# Patient Record
Sex: FEMALE | Race: WHITE | NOT HISPANIC OR LATINO | Employment: OTHER | URBAN - METROPOLITAN AREA
[De-identification: names, ages, dates, MRNs, and addresses within clinical notes are randomized per-mention and may not be internally consistent; named-entity substitution may affect disease eponyms.]

---

## 2017-01-06 ENCOUNTER — OFFICE VISIT (OUTPATIENT)
Dept: ENDOCRINOLOGY | Facility: CLINIC | Age: 67
End: 2017-01-06
Payer: MEDICARE

## 2017-01-06 ENCOUNTER — LAB VISIT (OUTPATIENT)
Dept: LAB | Facility: HOSPITAL | Age: 67
End: 2017-01-06
Attending: INTERNAL MEDICINE
Payer: MEDICARE

## 2017-01-06 VITALS
BODY MASS INDEX: 26.06 KG/M2 | WEIGHT: 166 LBS | SYSTOLIC BLOOD PRESSURE: 110 MMHG | HEIGHT: 67 IN | DIASTOLIC BLOOD PRESSURE: 64 MMHG | HEART RATE: 76 BPM

## 2017-01-06 DIAGNOSIS — E03.4 HYPOTHYROIDISM DUE TO ACQUIRED ATROPHY OF THYROID: Primary | ICD-10-CM

## 2017-01-06 DIAGNOSIS — M81.0 OSTEOPOROSIS: ICD-10-CM

## 2017-01-06 DIAGNOSIS — E04.1 NONTOXIC UNINODULAR GOITER: ICD-10-CM

## 2017-01-06 DIAGNOSIS — E55.9 UNSPECIFIED VITAMIN D DEFICIENCY: ICD-10-CM

## 2017-01-06 DIAGNOSIS — E03.9 HYPOTHYROIDISM, UNSPECIFIED TYPE: Primary | ICD-10-CM

## 2017-01-06 DIAGNOSIS — E03.4 HYPOTHYROIDISM DUE TO ACQUIRED ATROPHY OF THYROID: ICD-10-CM

## 2017-01-06 LAB
25(OH)D3+25(OH)D2 SERPL-MCNC: 90 NG/ML
TSH SERPL DL<=0.005 MIU/L-ACNC: 2.15 UIU/ML
TSH SERPL DL<=0.005 MIU/L-ACNC: 2.15 UIU/ML

## 2017-01-06 PROCEDURE — 82306 VITAMIN D 25 HYDROXY: CPT

## 2017-01-06 PROCEDURE — 99999 PR PBB SHADOW E&M-EST. PATIENT-LVL III: CPT | Mod: PBBFAC,,, | Performed by: INTERNAL MEDICINE

## 2017-01-06 PROCEDURE — 99204 OFFICE O/P NEW MOD 45 MIN: CPT | Mod: S$PBB,,, | Performed by: INTERNAL MEDICINE

## 2017-01-06 PROCEDURE — 36415 COLL VENOUS BLD VENIPUNCTURE: CPT | Mod: PO

## 2017-01-06 PROCEDURE — 84443 ASSAY THYROID STIM HORMONE: CPT

## 2017-01-06 RX ORDER — OLANZAPINE 20 MG/1
TABLET ORAL
Refills: 4 | COMMUNITY
Start: 2016-12-01 | End: 2019-01-31 | Stop reason: SDUPTHER

## 2017-01-06 NOTE — PROGRESS NOTES
Subjective:      Patient ID: Vasiliy Harrell is a 66 y.o. female.    Chief Complaint:  Hypothyroidism      History of Present Illness    Ms.Cathy BREANNE Harrell is moving her care to [facility] for her hypothyroidism   She was following with  for her hypothyroidism     She is been on synthroid 75 mcg daily for > 1 year     Found on routine examination   No h/o goiter   FH : none for thyroid disease     No height loss   No fractures   ? H/o osteoporosis   Takes calcium and vitamin D     Review of Systems   Constitutional: Negative for unexpected weight change.   Eyes: Positive for visual disturbance (blind with b/l eyes).   Cardiovascular: Negative for palpitations.   Gastrointestinal: Positive for diarrhea (once in a while, probiotics help). Negative for constipation.   Endocrine: Positive for heat intolerance and polydipsia.   Musculoskeletal: Negative for arthralgias and back pain.   Neurological: Negative for tremors.   Psychiatric/Behavioral: Negative for sleep disturbance. The patient is nervous/anxious.        Objective:   Physical Exam   Constitutional: She appears well-developed.   HENT:   Right Ear: External ear normal.   Left Ear: External ear normal.   Nose: Nose normal.   Neck: No tracheal deviation present. No thyromegaly present.   Cardiovascular: Normal rate.    No murmur heard.  Pulmonary/Chest: Effort normal and breath sounds normal.   Abdominal: Soft. There is no tenderness. No hernia.   Musculoskeletal: She exhibits no edema.   Neurological: She displays normal reflexes. No cranial nerve deficit.   Skin: No rash noted.          Psychiatric: She has a normal mood and affect. Judgment normal.   Vitals reviewed.      Lab Review:   Results for VASILIY HARRELL (MRN [id number]) as of 1/6/2017 11:24   Ref. Range 9/23/2016 10:30   Sodium Latest Ref Range: 136 - 145 mmol/L 145   Potassium Latest Ref Range: 3.5 - 5.1 mmol/L 4.2   Chloride Latest Ref Range: 95 - 110 mmol/L 106   CO2 Latest Ref Range: 23 - 29  mmol/L 27   Anion Gap Latest Ref Range: 8 - 16 mmol/L 12   BUN, Bld Latest Ref Range: 7 - 18 mg/dL 11   Creatinine Latest Ref Range: 0.50 - 1.40 mg/dL 0.72   eGFR if non African American Latest Ref Range: >60 mL/min/1.73 m^2 >60   eGFR if  Latest Ref Range: >60 mL/min/1.73 m^2 >60   Glucose Latest Ref Range: 70 - 110 mg/dL 96   Calcium Latest Ref Range: 8.4 - 10.2 mg/dL 9.1   Alkaline Phosphatase Latest Ref Range: 38 - 145 U/L 74   Total Protein Latest Ref Range: 6.0 - 8.4 g/dL 7.3   Albumin Latest Ref Range: 3.5 - 5.2 g/dL 4.2   Total Bilirubin Latest Ref Range: 0.1 - 1.0 mg/dL 0.6   AST Latest Ref Range: 14 - 36 U/L 26   ALT Latest Ref Range: 10 - 44 U/L 26     Results for VASILIY NOLASCO (MRN [id number]) as of 1/6/2017 11:24   Ref. Range 6/29/2016 11:22 8/15/2016 09:40   TSH Latest Ref Range: 0.400 - 4.000 uIU/mL 0.547 3.930   Free T4 Latest Ref Range: 0.78 - 2.19 ng/dL 1.38 1.20     Assessment:     1. Hypothyroidism due to acquired atrophy of thyroid  TSH    TSH   2. Unspecified vitamin D deficiency     3. Osteoporosis  Vitamin D        # hypothyroidism   - clinically euthyroid  - get TSH today  - continue synthroid 75 mcg daily     # ? osteoporosis   - continue vitamin d and calcium   Preferably food source  Get records from ;'s office   If DXA > 2 years ago we will repeat DXA scan   Not fracturing         Plan:     Follow up:  Get TSH, vit D today, CMP    RTC in 4 months with TSH

## 2017-01-06 NOTE — LETTER
January 6, 2017      Madonna Gant MD  [address] [address]           [facility]  [address] [facility] [address] [place] [address]  Phone: [phone]  Fax: [phone]          Patient: Yamileth Harrell   MR Number: [id number]   YOB: 1950   Date of Visit: 1/6/2017       Dear Dr. Madonna Gant:    Thank you for referring Yamileth Harrell to me for evaluation. Attached you will find relevant portions of my assessment and plan of care.    If you have questions, please do not hesitate to call me. I look forward to following Yamileth Harrell along with you.    Sincerely,    Wilton Hanna MD    Enclosure  CC:  No Recipients    If you would like to receive this communication electronically, please contact [email or web address] or [phone] to request more information on [employer] access.    For providers and/or their staff who would like to refer a patient to [facility], please contact us through our one-stop-shop provider referral line, [facility], at [phone].    If you feel you have received this communication in error or would no longer like to receive these types of communications, please e-mail [email or web address]

## 2017-01-09 ENCOUNTER — TELEPHONE (OUTPATIENT)
Dept: ENDOCRINOLOGY | Facility: CLINIC | Age: 67
End: 2017-01-09

## 2017-01-09 NOTE — TELEPHONE ENCOUNTER
Please call Ms.Yamileth RAYMUNDO Harrell and let her know that her TSH is at goal   Continue current dose of synthroid     Her vitamin D level is higher range of normal   Cut back to vitamin D 1000 units daily

## 2017-01-10 NOTE — TELEPHONE ENCOUNTER
LM informing pt's sister, Jeri Harrell, of lab results and Dr. Hanna's recommendation to continue current dose of synthroid and decrease Vit D to 1000 units daily. Call if any questions.

## 2017-04-19 ENCOUNTER — LAB VISIT (OUTPATIENT)
Dept: LAB | Facility: HOSPITAL | Age: 67
End: 2017-04-19
Attending: PEDIATRICS
Payer: MEDICARE

## 2017-04-19 DIAGNOSIS — E03.9 HYPOTHYROIDISM, UNSPECIFIED TYPE: ICD-10-CM

## 2017-04-19 LAB
T4 FREE SERPL-MCNC: 1.08 NG/DL
TSH SERPL DL<=0.005 MIU/L-ACNC: 4.99 UIU/ML

## 2017-04-19 PROCEDURE — 84439 ASSAY OF FREE THYROXINE: CPT

## 2017-04-19 PROCEDURE — 84443 ASSAY THYROID STIM HORMONE: CPT

## 2017-04-19 PROCEDURE — 36415 COLL VENOUS BLD VENIPUNCTURE: CPT | Mod: PO

## 2017-04-26 ENCOUNTER — OFFICE VISIT (OUTPATIENT)
Dept: ENDOCRINOLOGY | Facility: CLINIC | Age: 67
End: 2017-04-26
Payer: MEDICARE

## 2017-04-26 VITALS
DIASTOLIC BLOOD PRESSURE: 78 MMHG | HEIGHT: 67 IN | SYSTOLIC BLOOD PRESSURE: 124 MMHG | WEIGHT: 166.31 LBS | BODY MASS INDEX: 26.1 KG/M2 | HEART RATE: 100 BPM

## 2017-04-26 DIAGNOSIS — E03.9 HYPOTHYROIDISM, UNSPECIFIED TYPE: Primary | ICD-10-CM

## 2017-04-26 DIAGNOSIS — M81.0 OSTEOPOROSIS, UNSPECIFIED OSTEOPOROSIS TYPE, UNSPECIFIED PATHOLOGICAL FRACTURE PRESENCE: ICD-10-CM

## 2017-04-26 DIAGNOSIS — E03.4 HYPOTHYROIDISM DUE TO ACQUIRED ATROPHY OF THYROID: Primary | ICD-10-CM

## 2017-04-26 PROCEDURE — 99213 OFFICE O/P EST LOW 20 MIN: CPT | Mod: PBBFAC,PO | Performed by: INTERNAL MEDICINE

## 2017-04-26 PROCEDURE — 99214 OFFICE O/P EST MOD 30 MIN: CPT | Mod: S$PBB,,, | Performed by: INTERNAL MEDICINE

## 2017-04-26 PROCEDURE — 99999 PR PBB SHADOW E&M-EST. PATIENT-LVL III: CPT | Mod: PBBFAC,,, | Performed by: INTERNAL MEDICINE

## 2017-04-26 RX ORDER — LEVOTHYROXINE SODIUM 75 UG/1
75 TABLET ORAL
Qty: 90 TABLET | Refills: 3 | Status: SHIPPED | OUTPATIENT
Start: 2017-04-26 | End: 2017-10-25 | Stop reason: SDUPTHER

## 2017-04-26 RX ORDER — METOPROLOL TARTRATE 25 MG/1
TABLET, FILM COATED ORAL
Refills: 1 | COMMUNITY
Start: 2017-02-14 | End: 2017-06-26

## 2017-04-26 NOTE — PROGRESS NOTES
Subjective:      Patient ID: Vasiliy Harrell is a 66 y.o. female.    Chief Complaint:  Hypothyroidism      History of Present Illness    Ms.Cathy BREANNE Harrell is moving her care to [facility] for her hypothyroidism   She was following with  for her hypothyroidism     She is been on synthroid 75 mcg daily for > 1 year     Found on routine examination   No h/o goiter   FH : none for thyroid disease     No height loss   No fractures   ? H/o osteoporosis   Takes calcium and vitamin D     Review of Systems   Constitutional: Negative for unexpected weight change.   Eyes: Positive for visual disturbance (blind with b/l eyes).   Cardiovascular: Negative for palpitations.   Gastrointestinal: Positive for diarrhea (once in a while, probiotics help). Negative for constipation.   Endocrine: Positive for heat intolerance and polydipsia.   Musculoskeletal: Negative for arthralgias and back pain.   Neurological: Negative for tremors.   Psychiatric/Behavioral: Negative for sleep disturbance. The patient is nervous/anxious.        Objective:   Physical Exam   Constitutional: She appears well-developed.   HENT:   Right Ear: External ear normal.   Left Ear: External ear normal.   Nose: Nose normal.   Neck: No tracheal deviation present. No thyromegaly present.   Cardiovascular: Normal rate.    No murmur heard.  Pulmonary/Chest: Effort normal and breath sounds normal.   Abdominal: Soft. There is no tenderness. No hernia.   Musculoskeletal: She exhibits no edema.   Neurological: She displays normal reflexes. No cranial nerve deficit.   Skin: No rash noted.          Psychiatric: She has a normal mood and affect. Judgment normal.   Vitals reviewed.      Lab Review:   Results for VASILIY HARRELL (MRN [id number]) as of 1/6/2017 11:24   Ref. Range 9/23/2016 10:30   Sodium Latest Ref Range: 136 - 145 mmol/L 145   Potassium Latest Ref Range: 3.5 - 5.1 mmol/L 4.2   Chloride Latest Ref Range: 95 - 110 mmol/L 106   CO2 Latest Ref Range: 23 - 29  mmol/L 27   Anion Gap Latest Ref Range: 8 - 16 mmol/L 12   BUN, Bld Latest Ref Range: 7 - 18 mg/dL 11   Creatinine Latest Ref Range: 0.50 - 1.40 mg/dL 0.72   eGFR if non African American Latest Ref Range: >60 mL/min/1.73 m^2 >60   eGFR if  Latest Ref Range: >60 mL/min/1.73 m^2 >60   Glucose Latest Ref Range: 70 - 110 mg/dL 96   Calcium Latest Ref Range: 8.4 - 10.2 mg/dL 9.1   Alkaline Phosphatase Latest Ref Range: 38 - 145 U/L 74   Total Protein Latest Ref Range: 6.0 - 8.4 g/dL 7.3   Albumin Latest Ref Range: 3.5 - 5.2 g/dL 4.2   Total Bilirubin Latest Ref Range: 0.1 - 1.0 mg/dL 0.6   AST Latest Ref Range: 14 - 36 U/L 26   ALT Latest Ref Range: 10 - 44 U/L 26     Results for VASILIY NOLASCO (MRN [id number]) as of 1/6/2017 11:24   Ref. Range 6/29/2016 11:22 8/15/2016 09:40   TSH Latest Ref Range: 0.400 - 4.000 uIU/mL 0.547 3.930   Free T4 Latest Ref Range: 0.78 - 2.19 ng/dL 1.38 1.20     Results for orders placed or performed in visit on 04/19/17   TSH   Result Value Ref Range    TSH 4.987 (H) 0.400 - 4.000 uIU/mL   T4, free   Result Value Ref Range    Free T4 1.08 0.71 - 1.51 ng/dL       Assessment:     1. Hypothyroidism due to acquired atrophy of thyroid     2. Osteoporosis, unspecified osteoporosis type, unspecified pathological fracture presence          # hypothyroidism   - clinically euthyroid  - separate synthroid from other meds   - continue synthroid 75 mcg daily   - repeat TSH in 2 months and before next visit     # ? osteoporosis   - continue vitamin d and calcium   Preferably food source  States she will get DXA scan with    Avoid over treatment with synthroid     # CAD  Avoid overtreatment             Plan:     Follow up:  Blood work in 2 months and before next visit     RTC in 1 months with TSH

## 2017-04-26 NOTE — MR AVS SNAPSHOT
[facility]  [address] [facility] [address]  [facility] [address]  Phone: [phone]  Fax: [phone]                  Yamileth Harrell   2017 2:30 PM   Office Visit    Description:  Female : 1950   Provider:  Wilton Hanna MD   Department:  [facility]           Reason for Visit     Hypothyroidism           Diagnoses this Visit        Comments    Hypothyroidism due to acquired atrophy of thyroid    -  Primary     Osteoporosis, unspecified osteoporosis type, unspecified pathological fracture presence                To Do List           Future Appointments        Provider Department Dept Phone    2017 10:00 AM LAB, [place] [facility] [phone]      Goals (5 Years of Data)     None       These Medications        Disp Refills Start End    levothyroxine (SYNTHROID) 75 MCG tablet 90 tablet 3 2017     Take 1 tablet (75 mcg total) by mouth before breakfast. - Oral    Pharmacy: [facility] [address] [place] [address] [address] [place] [facility] [address] Ph #: [phone]         [facility] On Call     [facility] Nurse Care Line -  Assistance  Unless otherwise directed by your provider, please contact [facility], our nurse care line that is available for  assistance.     Registered nurses in the [facility] Center provide: appointment scheduling, clinical advisement, health education, and other advisory services.  Call: [phone] (toll free)               Medications           Message regarding Medications     Verify the changes and/or additions to your medication regime listed below are the same as discussed with your clinician today.  If any of these changes or additions are incorrect, please notify your healthcare provider.        CHANGE how you are taking these medications     Start Taking Instead of    levothyroxine (SYNTHROID) 75 MCG tablet SYNTHROID 75 mcg tablet    Dosage:  Take 1 tablet (75  "mcg total) by mouth before breakfast. Dosage:  TAKE 1 TABLET BY MOUTH BEFORE BREAKFAST    Reason for Change:  Reorder       STOP taking these medications     cholecalciferol, vitamin D3, 5,000 unit capsule Take 5,000 Units by mouth once daily.    omeprazole (PRILOSEC OTC) 20 MG tablet Take 20 mg by mouth daily as needed.            Verify that the below list of medications is an accurate representation of the medications you are currently taking.  If none reported, the list may be blank. If incorrect, please contact your healthcare provider. Carry this list with you in case of emergency.           Current Medications     aspirin (ECOTRIN) 81 MG EC tablet Take 81 mg by mouth once daily.    busPIRone (BUSPAR) 5 MG Tab Take 5 mg by mouth 3 (three) times daily.    cetirizine (ZYRTEC) 10 MG tablet Take 10 mg by mouth once daily.    levothyroxine (SYNTHROID) 75 MCG tablet Take 1 tablet (75 mcg total) by mouth before breakfast.    methylcellulose, laxative, (CITRUCEL) 500 mg Tab Take 1 tablet by mouth once daily.    metoprolol tartrate (LOPRESSOR) 25 MG tablet TK 1/2 T PO BID    multivitamin (ONE DAILY MULTIVITAMIN) per tablet Take 1 tablet by mouth once daily.    olanzapine (ZYPREXA) 20 MG tablet TK 1 T PO  AT BEDTIME    rosuvastatin (CRESTOR) 10 MG tablet Take one tablet by mouth daily           Clinical Reference Information           Your Vitals Were     BP Pulse Height Weight BMI    124/78 (BP Method: Manual) 100 5' 7" (1.702 m) 75.4 kg (166 lb 5.4 oz) 26.05 kg/m2      Blood Pressure          Most Recent Value    BP  124/78      Allergies as of 4/26/2017     Pcn [Penicillins]      Immunizations Administered on Date of Encounter - 4/26/2017     None      Orders Placed During Today's Visit     Future Labs/Procedures Expected by Expires    TSH  4/26/2017 6/25/2018    TSH  4/26/2017 6/25/2018    VITAMIN D  4/26/2017 6/25/2018    Vitamin D  4/26/2017 6/25/2018      [facility] Sign-Up     Activating your [email or web address] account is " as easy as 1-2-3!     1) Visit [email or web address], select Sign Up Now, enter this activation code and your date of birth, then select Next.  [email or web address]  Expires: 6/10/2017  3:37 PM      2) Create a username and password to use when you visit [facility] in the future and select a security question in case you lose your password and select Next.    3) Enter your e-mail address and click Sign Up!    Additional Information  If you have questions, please e-mail [email or web address] or call [phone] to talk to our [email or web address] staff. Remember, [facility] is NOT to be used for urgent needs. For medical emergencies, dial 911.         Language Assistance Services     ATTENTION: Language assistance services are available, free of charge. Please call [phone].      ATENCIÓN: Si habla emyañol, tiene a hickman disposición servicios gratuitos de asistencia lingüística. Llame al [phone].     CHÚ Ý: N?u b?n nói Ti?ng Vi?t, có các d?ch v? h? tr? ngôn ng? mi?n phí dành cho b?n. G?i s? [phone].         [facility] complies with applicable Federal civil rights laws and does not discriminate on the basis of race, color, national origin, age, disability, or sex.

## 2017-06-26 ENCOUNTER — LAB VISIT (OUTPATIENT)
Dept: LAB | Facility: HOSPITAL | Age: 67
End: 2017-06-26
Attending: INTERNAL MEDICINE
Payer: MEDICARE

## 2017-06-26 DIAGNOSIS — M81.0 OSTEOPOROSIS, UNSPECIFIED OSTEOPOROSIS TYPE, UNSPECIFIED PATHOLOGICAL FRACTURE PRESENCE: ICD-10-CM

## 2017-06-26 DIAGNOSIS — E03.4 HYPOTHYROIDISM DUE TO ACQUIRED ATROPHY OF THYROID: ICD-10-CM

## 2017-06-26 LAB — TSH SERPL DL<=0.005 MIU/L-ACNC: 1.5 UIU/ML

## 2017-06-26 PROCEDURE — 36415 COLL VENOUS BLD VENIPUNCTURE: CPT | Mod: PO

## 2017-06-26 PROCEDURE — 84443 ASSAY THYROID STIM HORMONE: CPT

## 2017-06-28 ENCOUNTER — LAB VISIT (OUTPATIENT)
Dept: LAB | Facility: HOSPITAL | Age: 67
End: 2017-06-28
Attending: INTERNAL MEDICINE
Payer: MEDICARE

## 2017-06-28 DIAGNOSIS — M81.0 OSTEOPOROSIS, UNSPECIFIED OSTEOPOROSIS TYPE, UNSPECIFIED PATHOLOGICAL FRACTURE PRESENCE: ICD-10-CM

## 2017-06-28 DIAGNOSIS — E03.4 HYPOTHYROIDISM DUE TO ACQUIRED ATROPHY OF THYROID: ICD-10-CM

## 2017-06-28 LAB — 25(OH)D3+25(OH)D2 SERPL-MCNC: 64 NG/ML

## 2017-06-28 PROCEDURE — 36415 COLL VENOUS BLD VENIPUNCTURE: CPT | Mod: PO

## 2017-06-28 PROCEDURE — 82306 VITAMIN D 25 HYDROXY: CPT

## 2018-04-24 DIAGNOSIS — E03.4 HYPOTHYROIDISM DUE TO ACQUIRED ATROPHY OF THYROID: ICD-10-CM

## 2018-04-25 RX ORDER — LEVOTHYROXINE SODIUM 75 UG/1
75 TABLET ORAL
Qty: 30 TABLET | Refills: 0 | Status: SHIPPED | OUTPATIENT
Start: 2018-04-25 | End: 2018-07-16 | Stop reason: SDUPTHER

## 2018-07-23 PROBLEM — M85.89 OSTEOPENIA OF MULTIPLE SITES: Status: ACTIVE | Noted: 2018-07-23

## 2019-03-26 ENCOUNTER — CLINICAL SUPPORT (OUTPATIENT)
Dept: REHABILITATION | Facility: HOSPITAL | Age: 69
End: 2019-03-26
Payer: MEDICARE

## 2019-03-26 DIAGNOSIS — M25.652 STIFFNESS OF LEFT HIP JOINT: ICD-10-CM

## 2019-03-26 DIAGNOSIS — R29.898 WEAKNESS OF LEFT HIP: ICD-10-CM

## 2019-03-26 PROCEDURE — 97162 PT EVAL MOD COMPLEX 30 MIN: CPT | Mod: PO

## 2019-03-26 PROCEDURE — 97110 THERAPEUTIC EXERCISES: CPT | Mod: PO

## 2019-03-26 NOTE — PATIENT INSTRUCTIONS
Bridge        Lie back, legs bent. Inhale, pressing hips up. Keeping ribs in, lengthen lower back. Exhale, rolling down along spine from top.  Repeat 10 times. Do 3 sessions per day.     [email or web address]     Copyright © [employer]. All rights reserved.   Low Back Stretch: One leg (Supine)        Lying on back, bring one knee toward chest by pulling gently behind knee.  Hold 30 seconds. Repeat with other leg. Perform 3 times for one session, 3 times per day.     Copyright © [employer]. All rights reserved.   External Rotation: Hip - Knees Apart (Hook-Lying)        Lie with hips and knees bent, band tied just above knees. Pull knees apart. Hold for 5 seconds.   Repeat 10 times. Do 3 times a day.    Copyright © [employer]. All rights reserved.
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
## 2019-03-27 NOTE — PROGRESS NOTES
Physical Therapy Daily Treatment Note     Name: Yamileth Harrell  Clinic Number: [id number]    Therapy Diagnosis:   Encounter Diagnoses   Name Primary?    Stiffness of left hip joint     Weakness of left hip      Physician: Robert Serrato MD    Visit Date: 3/28/2019  Physician Orders: PT Eval and Treat   Medical Diagnosis from Referral: trochanteric bursitis of left hip  Evaluation Date: 3/26/2019  Authorization Period Expiration: 12/31/19  Plan of Care Expiration: 5/26/19  Visit # / Visits authorized: 2/ 20    Time In: 1500  Time Out: 1540  Total Billable Time: 30 minutes    Precautions: Standard and schizophrenia, legally blind    Subjective     Pt reports: that her left hip hurts all the time. Patient states that walking and standing up for too long will make her left hip hurt. She was compliant with home exercise program.  Response to previous treatment: no adverse effect   Functional change: too soon to determine    Pain: 5/10  Location: left hip    Objective     Yamileth received therapeutic exercises to develop strength, endurance, ROM, flexibility, posture and core stabilization for 25 minutes including:  PROM to LLE (hip flexion, abduction, hip IR/ER) x 5 minutes   DL bridge 2x10  L SKTC x 30 sec x 2   Supine hip abduction x10 manual resistance  Supine hip adduction ball squeeze x 10, 3 sec hold   Seated LAQ 2x10 ea  Standing with upright posture x 1 minute x 2 (narrow base to challenge balance)     Yamileth received the following manual therapy techniques: passive stretching were applied to the: LLE for 10 minutes, including:  Passive L piriformis, hamstring, hip adductor stretch    Yamileth participated in dynamic functional therapeutic activities to improve functional performance for 3  minutes, including:  Wheelchair to car transfer (CGA)    Home Exercises Provided and Patient Education Provided     Education provided:   - Continued HEP compliance   - Patient and patient's care giver instructed to apply heating  pad to left hip if soreness occurs (parameters given by PTA: low heat setting for only 15-20 minutes)     Written Home Exercises Provided: Patient instructed to cont prior HEP.  Exercises were reviewed and Yamileth was able to demonstrate them prior to the end of the session.  Yamileth demonstrated good  understanding of the education provided.     See EMR under Patient Instructions for exercises provided 03/26/19.    Assessment   Yamileth tolerated treatment fairly well today. Patient demonstrates fear avoidance behaviors especially with PROM and stretching specific exercises. Patient did tolerate gentle strengthening well without complaints of increased pain. Patient continues with poor postural endurance during ambulation requiring rest breaks to forward flex. Patient able to transition from wheelchair to car with only CGA for hand placement but actual transfer was performed with SBA only.     aYmileth is progressing well towards her goals.   Pt prognosis is Fair.     Pt will continue to benefit from skilled outpatient physical therapy to address the deficits listed in the problem list box on initial evaluation, provide pt/family education and to maximize pt's level of independence in the home and community environment.     Pt's spiritual, cultural and educational needs considered and pt agreeable to plan of care and goals.    Anticipated barriers to physical therapy: schizophrenia, visual impairment    Goals:   Short Term Goals: 4 weeks   - amb 300' on level tile without pain provocation or need for rest (progressing, not met)  - do tandem stance>10 sec without LOB or pain (progressing, not met)  - do anterior step up of 6  without pain provocation (progressing, not met)     Long Term Goals: 8 weeks   - pt will demonstrate improved hip and leg strength by 1/2 point via MMT to demonstrate improved functional strength (progressing, not met)  - pt will demonstrate decreased tenderness to lateral thigh (progressing, not  met)  - pt will demonstrate improved hip ROM by 5 degrees in each plane on L to demonstrate improved mobility (progressing, not met)      Plan     Continue current POC with emphasis on hip stability and BLE strength.    Nicky Cifuentes, PTA

## 2019-03-28 ENCOUNTER — CLINICAL SUPPORT (OUTPATIENT)
Dept: REHABILITATION | Facility: HOSPITAL | Age: 69
End: 2019-03-28
Payer: MEDICARE

## 2019-03-28 DIAGNOSIS — M25.652 STIFFNESS OF LEFT HIP JOINT: ICD-10-CM

## 2019-03-28 DIAGNOSIS — R29.898 WEAKNESS OF LEFT HIP: ICD-10-CM

## 2019-03-28 PROCEDURE — 97110 THERAPEUTIC EXERCISES: CPT | Mod: PO

## 2019-03-28 PROCEDURE — 97140 MANUAL THERAPY 1/> REGIONS: CPT | Mod: PO

## 2019-04-01 ENCOUNTER — CLINICAL SUPPORT (OUTPATIENT)
Dept: REHABILITATION | Facility: HOSPITAL | Age: 69
End: 2019-04-01
Payer: MEDICARE

## 2019-04-01 DIAGNOSIS — M25.652 STIFFNESS OF LEFT HIP JOINT: ICD-10-CM

## 2019-04-01 DIAGNOSIS — R29.898 WEAKNESS OF LEFT HIP: ICD-10-CM

## 2019-04-01 PROCEDURE — 97140 MANUAL THERAPY 1/> REGIONS: CPT | Mod: PO

## 2019-04-01 PROCEDURE — 97110 THERAPEUTIC EXERCISES: CPT | Mod: PO

## 2019-04-01 NOTE — PROGRESS NOTES
Physical Therapy Daily Treatment Note     Name: Yamileth Harrell  Clinic Number: [id number]    Therapy Diagnosis:   Encounter Diagnoses   Name Primary?    Stiffness of left hip joint     Weakness of left hip      Physician: Robert Serrato MD    Visit Date: 4/1/2019  Physician Orders: PT Eval and Treat   Medical Diagnosis from Referral: trochanteric bursitis of left hip  Evaluation Date: 3/26/2019  Authorization Period Expiration: 12/31/19  Plan of Care Expiration: 5/26/19  Visit # / Visits authorized: 3/ 20    Time In: 1055  Time Out: 1140  Total Billable Time: 45 minutes    Precautions: Standard and schizophrenia, legally blind    Subjective     Pt reports: that her left hip wasn't sore after last treatment session. Patient states she did have pain last night but she was able to comfortably sleep on the left hip last night.  She was compliant with home exercise program.  Response to previous treatment: no adverse effect   Functional change: able to sleep on left hip     Pain: 4/10  Location: left hip    Objective     Yamileth received therapeutic exercises to develop strength, endurance, ROM, flexibility, posture and core stabilization for 25 minutes including:  Ambulation from lobby into gym: 1 seated rest break in chairs along wall (beginnging and end of session, no rest break needed at end of session)  PROM to LLE (hip flexion, abduction, hip IR/ER) x 5 minutes   Supine figure 4 stretch 20 sec x 3   DL bridge 2x10  L SKTC x 30 sec x 2   Supine hip abduction x10 manual resistance  Supine hip adduction ball squeeze x 10, 3 sec hold   R SL clamshells 2x10  Seated LAQ 2x10 ea  Standing with upright posture x 1 minute x 2 (narrow base to challenge balance)     Yamileth received the following manual therapy techniques: passive stretching were applied to the: LLE for 10 minutes, including:  Passive L piriformis, hamstring, hip adductor stretch    ome Exercises Provided and Patient Education Provided     Education provided:   -  Continued HEP compliance   - Patient and patient's care giver instructed to apply heating pad to left hip if soreness occurs (parameters given by PTA: low heat setting for only 15-20 minutes)     Written Home Exercises Provided: Patient instructed to cont prior HEP.  Exercises were reviewed and Yamileth was able to demonstrate them prior to the end of the session.  Yamileth demonstrated good  understanding of the education provided.     See EMR under Patient Instructions for exercises provided 03/26/19.    Assessment   Yamileth tolerated treatment very well today. Patient demonstrates improving tolerance to ambulation as she did not need a seated rest break with return trip to [facility]. Patient able to progress to clamshells against gravity today with proper exercise form. Patient with improved tolerance to manual stretching today as she allowed for knee extension in order to properly stretch hamstrings.     Yamileth is progressing well towards her goals.   Pt prognosis is Fair.     Pt will continue to benefit from skilled outpatient physical therapy to address the deficits listed in the problem list box on initial evaluation, provide pt/family education and to maximize pt's level of independence in the home and community environment.     Pt's spiritual, cultural and educational needs considered and pt agreeable to plan of care and goals.    Anticipated barriers to physical therapy: schizophrenia, visual impairment    Goals:   Short Term Goals: 4 weeks   - amb 300' on level tile without pain provocation or need for rest (progressing, not met)  - do tandem stance>10 sec without LOB or pain (progressing, not met)  - do anterior step up of 6  without pain provocation (progressing, not met)     Long Term Goals: 8 weeks   - pt will demonstrate improved hip and leg strength by 1/2 point via MMT to demonstrate improved functional strength (progressing, not met)  - pt will demonstrate decreased tenderness to lateral thigh (progressing,  not met)  - pt will demonstrate improved hip ROM by 5 degrees in each plane on L to demonstrate improved mobility (progressing, not met)      Plan     Continue current POC with emphasis on hip stability and BLE strength.    Nicky Cifuentes, PTA

## 2019-04-03 ENCOUNTER — CLINICAL SUPPORT (OUTPATIENT)
Dept: REHABILITATION | Facility: HOSPITAL | Age: 69
End: 2019-04-03
Payer: MEDICARE

## 2019-04-03 DIAGNOSIS — R29.898 WEAKNESS OF LEFT HIP: ICD-10-CM

## 2019-04-03 DIAGNOSIS — M25.652 STIFFNESS OF LEFT HIP JOINT: ICD-10-CM

## 2019-04-03 PROCEDURE — 97140 MANUAL THERAPY 1/> REGIONS: CPT | Mod: PO

## 2019-04-03 PROCEDURE — 97110 THERAPEUTIC EXERCISES: CPT | Mod: PO

## 2019-04-03 NOTE — PROGRESS NOTES
Physical Therapy Daily Treatment Note     Name: Yamileth Harrell  Clinic Number: [id number]    Therapy Diagnosis:   Encounter Diagnoses   Name Primary?    Stiffness of left hip joint     Weakness of left hip      Physician: Robert Serrato MD    Visit Date: 4/3/2019  Physician Orders: PT Eval and Treat   Medical Diagnosis from Referral: trochanteric bursitis of left hip  Evaluation Date: 3/26/2019  Authorization Period Expiration: 12/31/19  Plan of Care Expiration: 5/26/19  Visit # / Visits authorized: 4/ 20    Time In: 1055  Time Out: 1145  Total Billable Time: 45 minutes    Precautions: Standard and schizophrenia, legally blind    Subjective     Pt reports: her hip felt great after therapy last session but she is very sore today. Patient states she has been exercising at home a lot and she is now very sore. Patient states her pain is always worse with walking. Patient's sister states that Yamileth tends to overdue her exercises and she feels that this is contributing to her soreness today. Patient's sister also states Yamileth doesn't spend much time walking at home and she is often in the recliner. She was compliant with home exercise program.  Response to previous treatment: no adverse effect   Functional change: able to sleep on left hip     Pain: 8/10  Location: left hip    Objective     Yamileth received therapeutic exercises to develop strength, endurance, ROM, flexibility, posture and core stabilization for 40 minutes including:   Ambulation from lobby into gym: 1 seated rest break in chairs along wall (beginnging and end of session, no rest break needed at end of session) (not performed today)  PROM to LLE (hip flexion, abduction, hip IR/ER) x 5 minutes   Supine figure 4 stretch 20 sec x 3   DL bridge 2x10  L SKTC x 30 sec x 2 (performed passive only)  Supine hip abduction x 10 manual resistance  Supine hip adduction ball squeeze x 10, 3 sec hold   R SL clamshells 2x10  Seated LAQ 2x10 ea  Standing with upright  posture x 1 minute x 2 (narrow base to challenge balance)     Yamileth received the following manual therapy techniques: passive stretching were applied to the: LLE for 10 minutes, including:  Passive L piriformis, hamstring, hip adductor stretch    ome Exercises Provided and Patient Education Provided     Education provided:   - Continued HEP compliance   - Patient instructed to stand up 1x every hour to promote upright posture. Patient also instructed to perform SKTC activity 1x per day. Patient also to perform bridging and glut sets 2-3x per day.     Written Home Exercises Provided: see education provided above.  Exercises were reviewed and Yamileth was able to demonstrate them prior to the end of the session.  Yamileth demonstrated good  understanding of the education provided.     See EMR under Patient Instructions for exercises provided 03/26/19.    Assessment   Yamileth with only fair tolerance to treatment today. Patient unable to perform ambulation activities today due to left hip pain. Attempted to stretch patient's left hip flexor but max flexibility restrictions noted and patient did not allow for positioning to achieve proper stretch. Patient educated that she is overdoing her exercises at home and this is contributing to her increased soreness over the last 2 days. Patient educated on the importance of performing gluteal strengthening and standing exercises today in order to achieve functional hip flexor stretch and neutral positioning. Both patient and patient's care giver reported understanding of instructions provided today.      Yamileth is progressing well towards her goals.   Pt prognosis is Fair.     Pt will continue to benefit from skilled outpatient physical therapy to address the deficits listed in the problem list box on initial evaluation, provide pt/family education and to maximize pt's level of independence in the home and community environment.     Pt's spiritual, cultural and educational needs  considered and pt agreeable to plan of care and goals.    Anticipated barriers to physical therapy: schizophrenia, visual impairment    Goals:   Short Term Goals: 4 weeks   - amb 300' on level tile without pain provocation or need for rest (progressing, not met)  - do tandem stance>10 sec without LOB or pain (progressing, not met)  - do anterior step up of 6  without pain provocation (progressing, not met)     Long Term Goals: 8 weeks   - pt will demonstrate improved hip and leg strength by 1/2 point via MMT to demonstrate improved functional strength (progressing, not met)  - pt will demonstrate decreased tenderness to lateral thigh (progressing, not met)  - pt will demonstrate improved hip ROM by 5 degrees in each plane on L to demonstrate improved mobility (progressing, not met)      Plan     Continue current POC with emphasis on hip stability and BLE strength.    Nicky Cifuentes, PTA

## 2019-04-08 ENCOUNTER — CLINICAL SUPPORT (OUTPATIENT)
Dept: REHABILITATION | Facility: HOSPITAL | Age: 69
End: 2019-04-08
Payer: MEDICARE

## 2019-04-08 DIAGNOSIS — M25.652 STIFFNESS OF LEFT HIP JOINT: ICD-10-CM

## 2019-04-08 DIAGNOSIS — R29.898 WEAKNESS OF LEFT HIP: ICD-10-CM

## 2019-04-08 PROCEDURE — 97110 THERAPEUTIC EXERCISES: CPT | Mod: PO

## 2019-04-08 PROCEDURE — 97140 MANUAL THERAPY 1/> REGIONS: CPT | Mod: PO

## 2019-04-08 NOTE — PROGRESS NOTES
Physical Therapy Daily Treatment Note     Name: Yamileth Harrell  Clinic Number: [id number]    Therapy Diagnosis:   Encounter Diagnoses   Name Primary?    Stiffness of left hip joint     Weakness of left hip      Physician: Robert Serrato MD    Visit Date: 4/8/2019  Physician Orders: PT Eval and Treat   Medical Diagnosis from Referral: trochanteric bursitis of left hip  Evaluation Date: 3/26/2019  Authorization Period Expiration: 12/31/19  Plan of Care Expiration: 5/26/19  Visit # / Visits authorized: 5/ 20    Time In: 1000  Time Out: 1055  Total Billable Time: 55 minutes    Precautions: Standard and schizophrenia, legally blind    Subjective     Pt reports: reports she was painful all weekend. Family is concerned that she shouldn't do her exercises at home because they are increasing her pain. She was compliant with home exercise program.  Response to previous treatment: no adverse effect   Functional change: able to sleep on left hip     Pain: 5/10  Location: left hip    Objective     Yamileth received therapeutic exercises to develop strength, endurance, ROM, flexibility, posture and core stabilization for 45 minutes including:   Ambulation from lobby into gym: no rest break needed on entrance  PROM to LLE (hip flexion, abduction, hip IR/ER) x 5 minutes   Supine figure 4 stretch 20 sec x 3   DL bridge 2x10  L SKTC x 30 sec x 2 (performed passive only)  Supine hip abduction x 10 manual resistance  Supine hip adduction ball squeeze 2 x 10, 3 sec hold   Supine hip flexor stretch x2 min  R SL clamshells 2x10  Seated LAQ 2x10 ea  Standing with upright posture x 1 minute x 2 (narrow base to challenge balance)   Tandem stance x30 sec each  Standing hip extension, abduction x10 each    Yamileth received the following manual therapy techniques: passive stretching were applied to the: LLE for 10 minutes, including:  Passive L piriformis, hamstring, hip adductor stretch    ome Exercises Provided and Patient Education Provided      Education provided:   - Continued HEP compliance   - Patient instructed to stand up 1x every hour to promote upright posture. Patient also instructed to perform SKTC activity 1x per day. Patient also to perform bridging and glut sets 2-3x per day.     Written Home Exercises Provided: see education provided above.  Exercises were reviewed and Yamileth was able to demonstrate them prior to the end of the session.  Yamileth demonstrated good  understanding of the education provided.     See EMR under Patient Instructions for exercises provided 03/26/19.    Assessment   Pt with fair-poor tolerance for exercises again today. Significant tightness in piriformis and hip flexors, with significant limitations with ROM. Required a lot of education on the importance of stretching into these motions, despite having some increase in pain. Pt reported understanding. Educated patient and family to focus on strengthening exercises at home, to perform only the sets and reps listed, and to perform them with someone else present.    Yamileth is progressing well towards her goals.   Pt prognosis is Fair.     Pt will continue to benefit from skilled outpatient physical therapy to address the deficits listed in the problem list box on initial evaluation, provide pt/family education and to maximize pt's level of independence in the home and community environment.     Pt's spiritual, cultural and educational needs considered and pt agreeable to plan of care and goals.    Anticipated barriers to physical therapy: schizophrenia, visual impairment    Goals:   Short Term Goals: 4 weeks   - amb 300' on level tile without pain provocation or need for rest (progressing, not met)  - do tandem stance>10 sec without LOB or pain (progressing, not met)  - do anterior step up of 6  without pain provocation (progressing, not met)     Long Term Goals: 8 weeks   - pt will demonstrate improved hip and leg strength by 1/2 point via MMT to demonstrate improved  functional strength (progressing, not met)  - pt will demonstrate decreased tenderness to lateral thigh (progressing, not met)  - pt will demonstrate improved hip ROM by 5 degrees in each plane on L to demonstrate improved mobility (progressing, not met)      Plan     Continue current POC with emphasis on hip stability and BLE strength.    Nicky Parr, PT

## 2019-04-10 ENCOUNTER — CLINICAL SUPPORT (OUTPATIENT)
Dept: REHABILITATION | Facility: HOSPITAL | Age: 69
End: 2019-04-10
Payer: MEDICARE

## 2019-04-10 DIAGNOSIS — R29.898 WEAKNESS OF LEFT HIP: ICD-10-CM

## 2019-04-10 DIAGNOSIS — M25.652 STIFFNESS OF LEFT HIP JOINT: ICD-10-CM

## 2019-04-10 PROCEDURE — 97140 MANUAL THERAPY 1/> REGIONS: CPT | Mod: PO

## 2019-04-10 PROCEDURE — 97110 THERAPEUTIC EXERCISES: CPT | Mod: PO

## 2019-04-10 NOTE — PROGRESS NOTES
"  Physical Therapy Daily Treatment Note     Name: Yamileth Harrell  Clinic Number: [id number]    Therapy Diagnosis:   Encounter Diagnoses   Name Primary?    Stiffness of left hip joint     Weakness of left hip      Physician: Robert Serrato MD    Visit Date: 4/10/2019  Physician Orders: PT Eval and Treat   Medical Diagnosis from Referral: trochanteric bursitis of left hip  Evaluation Date: 3/26/2019  Authorization Period Expiration: 12/31/19  Plan of Care Expiration: 5/26/19  Visit # / Visits authorized: 6/ 20    Time In: 1500  Time Out: 1600  Total Billable Time: 60 minutes    Precautions: Standard and schizophrenia, legally blind    Subjective     Pt reports: she is exhausted today from doctor's appointments. She had to sit in a wheelchair all day. She was compliant with home exercise program.  Response to previous treatment: no adverse effect   Functional change: able to sleep on left hip     Pain: 5/10  Location: left hip    Objective     Yamileth received therapeutic exercises to develop strength, endurance, ROM, flexibility, posture and core stabilization for 45 minutes including:   Ambulation from lobby into gym: no rest break needed on entrance  PROM to LLE (hip flexion, abduction, hip IR/ER) x 5 minutes   Supine figure 4 stretch 20 sec x 3   DL bridge 2x10  L SKTC x 30 sec x 2 (performed passive only)  Supine hip abduction x 10 manual resistance  Supine hip adduction ball squeeze 2 x 10, 3 sec hold   Supine hip flexor stretch x2 min  SL clamshells 2x10 BLE  TA set 10x5"  Seated LAQ 2x10 ea  Seated hamstring curl red theraband 2x10 each  Sit-to-stand x10   Standing with upright posture x 1 minute x 2 (narrow base to challenge balance)   Tandem stance x30 sec each  Standing hip extension, abduction x10 each    Yamileth received the following manual therapy techniques: passive stretching were applied to the: LLE for 15 minutes, including:  Passive L piriformis, hamstring, hip adductor stretch  Stick massage to IT band, " glutes, pirformis    ome Exercises Provided and Patient Education Provided     Education provided:   - Continued HEP compliance   - Patient instructed to stand up 1x every hour to promote upright posture. Patient also instructed to perform SKTC activity 1x per day. Patient also to perform bridging and glut sets 2-3x per day.     Written Home Exercises Provided: see education provided above.  Exercises were reviewed and Yamileth was able to demonstrate them prior to the end of the session.  Yamileth demonstrated good  understanding of the education provided.     See EMR under Patient Instructions for exercises provided 03/26/19.    Assessment   Unable to perform pelvic tilt but able to perform fair TA contraction without pelvic motion. Significant pain catastrophizing tendencies noted, with muscle guarding and difficulty understanding the difference between muscle stretch, muscle contraction, and pain. Continues to lack 10 degrees of extension from neutral with supine hip flexor stretch initially, improved with cues for deep breathing. Reported feeling better and looser after stick massage.   Yamileth is progressing well towards her goals.   Pt prognosis is Fair.     Pt will continue to benefit from skilled outpatient physical therapy to address the deficits listed in the problem list box on initial evaluation, provide pt/family education and to maximize pt's level of independence in the home and community environment.     Pt's spiritual, cultural and educational needs considered and pt agreeable to plan of care and goals.    Anticipated barriers to physical therapy: schizophrenia, visual impairment    Goals:   Short Term Goals: 4 weeks   - amb 300' on level tile without pain provocation or need for rest (progressing, not met)  - do tandem stance>10 sec without LOB or pain (progressing, not met)  - do anterior step up of 6  without pain provocation (progressing, not met)     Long Term Goals: 8 weeks   - pt will demonstrate  improved hip and leg strength by 1/2 point via MMT to demonstrate improved functional strength (progressing, not met)  - pt will demonstrate decreased tenderness to lateral thigh (progressing, not met)  - pt will demonstrate improved hip ROM by 5 degrees in each plane on L to demonstrate improved mobility (progressing, not met)      Plan     Continue current POC with emphasis on hip stability and BLE strength.    Nicky Parr, PT

## 2019-04-15 ENCOUNTER — CLINICAL SUPPORT (OUTPATIENT)
Dept: REHABILITATION | Facility: HOSPITAL | Age: 69
End: 2019-04-15
Payer: MEDICARE

## 2019-04-15 DIAGNOSIS — R29.898 WEAKNESS OF LEFT HIP: ICD-10-CM

## 2019-04-15 DIAGNOSIS — M25.652 STIFFNESS OF LEFT HIP JOINT: ICD-10-CM

## 2019-04-15 PROCEDURE — 97140 MANUAL THERAPY 1/> REGIONS: CPT | Mod: PO

## 2019-04-15 PROCEDURE — 97110 THERAPEUTIC EXERCISES: CPT | Mod: PO

## 2019-04-15 NOTE — PROGRESS NOTES
"  Physical Therapy Daily Treatment Note     Name: Yamileth Harrell  Clinic Number: [id number]    Therapy Diagnosis:   Encounter Diagnoses   Name Primary?    Stiffness of left hip joint     Weakness of left hip      Physician: Robert Serrato MD    Visit Date: 4/15/2019  Physician Orders: PT Eval and Treat   Medical Diagnosis from Referral: trochanteric bursitis of left hip  Evaluation Date: 3/26/2019  Authorization Period Expiration: 12/31/19  Plan of Care Expiration: 5/26/19  Visit # / Visits authorized: 7/ 20    Time In: 1055  Time Out: 1153  Total Billable Time: 58 minutes    Precautions: Standard and schizophrenia, legally blind    Subjective     Pt reports: she felt better after last treatment session. Patient states she does have less pain when walking from her bedroom to the living room. Patient states she is very focused on her pain and she doesn't feel like it is getting much better despite reports of improved walking tolerance in the house.  Patient states she now lies on her left side to make her hip "numb" so she can tolerate lying on her back.  She was compliant with home exercise program.  Response to previous treatment: no adverse effect   Functional change: able to sleep on left hip     Pain: 5/10  Location: left hip    Objective     Yamileth received therapeutic exercises to develop strength, endurance, ROM, flexibility, posture and core stabilization for 45 minutes including:   Ambulation from lobby into gym: 1x rest break   PROM to LLE (hip flexion, abduction, hip IR/ER) x 5 minutes   Prone lying x 30 sec (patient did not tolerate)  L SKTC x 30 sec x 2 (performed passive only)  DL bridge 2x10  Seated LAQ 2x10 ea (with sciatic nerve glide)  Standing upright x 1:30 minutes x 4 (heavy focus on upright trunk)   Sit-to-stand x10   Tandem stance x30 sec each  Standing hip extension, abduction x10 each    Not performed today:  Supine figure 4 stretch 20 sec x 3   Supine hip abduction x 10 manual " "resistance  Supine hip adduction ball squeeze 2 x 10, 3 sec hold   Supine hip flexor stretch x2 min  SL clamshells 2x10 BLE  TA set 10x5"  Seated hamstring curl red theraband 2x10 each    Yamileth received the following manual therapy techniques: passive stretching were applied to the: LLE for 10 minutes, including:  Passive L piriformis, hamstring, hip adductor stretch  Stick massage to IT band, glutes, pirformis    Home Exercises Provided and Patient Education Provided     Education provided:   - Continued HEP compliance, Importance of not over doing it   - Educated caregiver again today on the importance of standing several times a day to promote upright posture and normal spina curvature     Written Home Exercises Provided: Patient instructed to cont prior HEP.  Exercises were reviewed and Yamileth was able to demonstrate them prior to the end of the session.  Yamileth demonstrated good  understanding of the education provided.     See EMR under Patient Instructions for exercises provided 03/26/19.    Assessment   Yamileth with only fair tolerance to treatment session. Patient very expressive and vocal about L hip pain today and she did not allow for proper positioning to achieve stretching. Attempted prone positioning today to determine directional preference but patient unable to tolerate but stretching was noted through B hip flexors. Poor standing tolerance with pain limiting behaviors noted despite heavy education on the importance on upright posture.   Yamileth is progressing well towards her goals.   Pt prognosis is Fair.     Pt will continue to benefit from skilled outpatient physical therapy to address the deficits listed in the problem list box on initial evaluation, provide pt/family education and to maximize pt's level of independence in the home and community environment.     Pt's spiritual, cultural and educational needs considered and pt agreeable to plan of care and goals.    Anticipated barriers to physical " therapy: schizophrenia, visual impairment    Goals:   Short Term Goals: 4 weeks   - amb 300' on level tile without pain provocation or need for rest (progressing, not met)  - do tandem stance>10 sec without LOB or pain (progressing, not met)  - do anterior step up of 6  without pain provocation (progressing, not met)     Long Term Goals: 8 weeks   - pt will demonstrate improved hip and leg strength by 1/2 point via MMT to demonstrate improved functional strength (progressing, not met)  - pt will demonstrate decreased tenderness to lateral thigh (progressing, not met)  - pt will demonstrate improved hip ROM by 5 degrees in each plane on L to demonstrate improved mobility (progressing, not met)    Plan     Continue current POC with emphasis on hip stability and BLE strength.    Nicky Cifuentes, PTA

## 2019-04-18 ENCOUNTER — CLINICAL SUPPORT (OUTPATIENT)
Dept: REHABILITATION | Facility: HOSPITAL | Age: 69
End: 2019-04-18
Payer: MEDICARE

## 2019-04-18 DIAGNOSIS — M25.652 STIFFNESS OF LEFT HIP JOINT: ICD-10-CM

## 2019-04-18 DIAGNOSIS — R29.898 WEAKNESS OF LEFT HIP: ICD-10-CM

## 2019-04-18 PROCEDURE — 97140 MANUAL THERAPY 1/> REGIONS: CPT | Mod: PO

## 2019-04-18 PROCEDURE — 97110 THERAPEUTIC EXERCISES: CPT | Mod: PO

## 2019-04-18 NOTE — PROGRESS NOTES
Physical Therapy Daily Treatment Note     Name: Yamileth Harrell  Clinic Number: [id number]    Therapy Diagnosis:   Encounter Diagnoses   Name Primary?    Stiffness of left hip joint     Weakness of left hip      Physician: Robert Serrato MD    Visit Date: 4/18/2019  Physician Orders: PT Eval and Treat   Medical Diagnosis from Referral: trochanteric bursitis of left hip  Evaluation Date: 3/26/2019  Authorization Period Expiration: 12/31/19  Plan of Care Expiration: 5/26/19  Visit # / Visits authorized: 8/ 20    Time In: 1100  Time Out: 1200  Total Billable Time: 60 minutes    Precautions: Standard and schizophrenia, legally blind    Subjective     Pt reports: She went to see the chiropractor and got 2 adjustments. They did laser therapy and ultrasound as well, and want us to continue with ultrasound at therapy. Educated patient and family that we would not be performing ultrasound in therapy, as strengthening and stretching would be more effective for her specific issues.  She was compliant with home exercise program.  Response to previous treatment: no adverse effect   Functional change: able to sleep on left hip     Pain: 5/10  Location: left hip    Objective     Yamileth received therapeutic exercises to develop strength, endurance, ROM, flexibility, posture and core stabilization for 45 minutes including:   Ambulation from lobby into gym: 1x rest break   PROM to LLE (hip flexion, abduction, hip IR/ER) x 5 minutes   Prone quad stretch 2x1 min  Supine hip flexor stretch x2 min  DL bridge 2x10  LTR x10 each  Pelvic tilt x2 min  Seated LAQ 2x10 ea (with sciatic nerve glide)  Standing upright x 1:30 minutes x 4 (heavy focus on upright trunk)   Sit-to-stand x10   Tandem stance x30 sec each  Standing hip extension, abduction x10 each  Seated hamstring curl red theraband 2x10 each  SL clamshells 2x10 BLE red theraband    Not performed today:  L SKTC x 30 sec x 2 (performed passive only)  Supine figure 4 stretch 20 sec x 3  "  Supine hip abduction x 10 manual resistance  Supine hip adduction ball squeeze 2 x 10, 3 sec hold   TA set 10x5"    Yamileth received the following manual therapy techniques: passive stretching were applied to the: LLE for 15 minutes, including:  Passive L piriformis, hamstring, hip adductor stretch  Stick massage to IT band, glutes, pirformis    Home Exercises Provided and Patient Education Provided     Education provided:   - Continued HEP compliance, Importance of not over doing it   - Educated caregiver again today on the importance of standing several times a day to promote upright posture and normal spina curvature     Written Home Exercises Provided: Patient instructed to cont prior HEP.  Exercises were reviewed and Yamileth was able to demonstrate them prior to the end of the session.  Yamileth demonstrated good  understanding of the education provided.     See EMR under Patient Instructions for exercises provided 03/26/19.    Assessment   Pt with worse tolerance when walking in after her chiropractic appointment this morning, requiring 4-5 rest breaks. Fair tolerance for new exercises, with no pain in prone positioning, but reported pain with addition of quad stretch. Continued perseveration on pain symptoms, and no ability to distinguish between pain and stretching. Educated patient and family to perform 1 min of prone lying 1x/hour at home.   Yamileth is progressing well towards her goals.   Pt prognosis is Fair.     Pt will continue to benefit from skilled outpatient physical therapy to address the deficits listed in the problem list box on initial evaluation, provide pt/family education and to maximize pt's level of independence in the home and community environment.     Pt's spiritual, cultural and educational needs considered and pt agreeable to plan of care and goals.    Anticipated barriers to physical therapy: schizophrenia, visual impairment    Goals:   Short Term Goals: 4 weeks   - amb 300' on level tile " without pain provocation or need for rest (progressing, not met)  - do tandem stance>10 sec without LOB or pain (progressing, not met)  - do anterior step up of 6  without pain provocation (progressing, not met)     Long Term Goals: 8 weeks   - pt will demonstrate improved hip and leg strength by 1/2 point via MMT to demonstrate improved functional strength (progressing, not met)  - pt will demonstrate decreased tenderness to lateral thigh (progressing, not met)  - pt will demonstrate improved hip ROM by 5 degrees in each plane on L to demonstrate improved mobility (progressing, not met)    Plan     Continue current POC with emphasis on hip stability and BLE strength.    Nicky Parr, PT

## 2019-04-22 ENCOUNTER — CLINICAL SUPPORT (OUTPATIENT)
Dept: REHABILITATION | Facility: HOSPITAL | Age: 69
End: 2019-04-22
Payer: MEDICARE

## 2019-04-22 DIAGNOSIS — M25.652 STIFFNESS OF LEFT HIP JOINT: ICD-10-CM

## 2019-04-22 DIAGNOSIS — R29.898 WEAKNESS OF LEFT HIP: ICD-10-CM

## 2019-04-22 PROCEDURE — 97110 THERAPEUTIC EXERCISES: CPT | Mod: PO

## 2019-04-22 NOTE — PROGRESS NOTES
"  Physical Therapy Daily Treatment Note     Name: Yamileth Harrell  Clinic Number: [id number]    Therapy Diagnosis:   Encounter Diagnoses   Name Primary?    Stiffness of left hip joint     Weakness of left hip      Physician: Robert Serrato MD    Visit Date: 4/22/2019  Physician Orders: PT Eval and Treat   Medical Diagnosis from Referral: trochanteric bursitis of left hip  Evaluation Date: 3/26/2019  Authorization Period Expiration: 12/31/19  Plan of Care Expiration: 5/26/19  Visit # / Visits authorized: 9/ 20    Time In: 0830  Time Out: 0923  Total Billable Time: 5 minutes    Precautions: Standard and schizophrenia, legally blind    Subjective     Pt reports: She reports she did try laying on her stomach and "it was incredible". She reports she had pain yesterday. She was compliant with home exercise program.  Response to previous treatment: no adverse effect   Functional change: able to sleep on left hip     Pain: 4/10  Location: left hip    Objective     Yamileth received therapeutic exercises to develop strength, endurance, ROM, flexibility, posture and core stabilization for 53 minutes including:   Ambulation from lobby into gym: 0x rest break   PROM to LLE (hip flexion, abduction, hip IR/ER) x 5 minutes   Prone lying x2 min  Prone quad stretch 2x1 min- NP  Supine hip flexor stretch x2 min  DL bridge 2x10  LTR x10 each  Pelvic tilt x2 min- NP  Seated LAQ 2x10 ea (with sciatic nerve glide)  Standing upright x 1:30 minutes x 4 (heavy focus on upright trunk)   Sit-to-stand x10   Standing TA contraction 20x5"  Tandem stance x30 sec each  Standing hip extension, abduction x10 each  Seated hamstring curl red theraband 2x10 each  SL clamshells 2x10 BLE red theraband    Not performed today:  L SKTC x 30 sec x 2 (performed passive only)  Supine figure 4 stretch 20 sec x 3   Supine hip abduction x 10 manual resistance  Supine hip adduction ball squeeze 2 x 10, 3 sec hold   TA set 10x5"    Yamileth received the following manual " "therapy techniques: passive stretching were applied to the: LLE for 15 minutes, including:- NP  Passive L piriformis, hamstring, hip adductor stretch  Stick massage to IT band, glutes, pirformis    Home Exercises Provided and Patient Education Provided     Education provided:   - Continued HEP compliance, Importance of not over doing it   - Educated caregiver again today on the importance of standing several times a day to promote upright posture and normal spina curvature     Written Home Exercises Provided: Patient instructed to cont prior HEP.  Exercises were reviewed and Yamileth was able to demonstrate them prior to the end of the session.  Yamileth demonstrated good  understanding of the education provided.     See EMR under Patient Instructions for exercises provided 03/26/19.    Assessment   Pt was able to walk into the clinic without any rest breaks today. She continues to report pain with hip stretching, especially into ER. Attempted prone on elbows today, but patient did not tolerate it. Pt perseverates, "My leg is not well" and refuses to hold stretches and exercises for the prescribed period of time.   Yamileth is progressing well towards her goals.   Pt prognosis is Fair.     Pt will continue to benefit from skilled outpatient physical therapy to address the deficits listed in the problem list box on initial evaluation, provide pt/family education and to maximize pt's level of independence in the home and community environment.     Pt's spiritual, cultural and educational needs considered and pt agreeable to plan of care and goals.    Anticipated barriers to physical therapy: schizophrenia, visual impairment    Goals:   Short Term Goals: 4 weeks   - amb 300' on level tile without pain provocation or need for rest (progressing, not met)  - do tandem stance>10 sec without LOB or pain (progressing, not met)  - do anterior step up of 6  without pain provocation (progressing, not met)     Long Term Goals: 8 weeks "   - pt will demonstrate improved hip and leg strength by 1/2 point via MMT to demonstrate improved functional strength (progressing, not met)  - pt will demonstrate decreased tenderness to lateral thigh (progressing, not met)  - pt will demonstrate improved hip ROM by 5 degrees in each plane on L to demonstrate improved mobility (progressing, not met)    Plan     Continue current POC with emphasis on hip stability and BLE strength.    Nicky Parr, PT

## 2020-05-26 PROBLEM — H54.8 LEGALLY BLIND: Status: ACTIVE | Noted: 2020-05-26

## 2021-01-22 ENCOUNTER — PATIENT MESSAGE (OUTPATIENT)
Dept: ADMINISTRATIVE | Facility: OTHER | Age: 71
End: 2021-01-22

## 2021-02-23 PROBLEM — N39.0 ACUTE UTI: Status: ACTIVE | Noted: 2021-02-23

## 2021-02-23 PROBLEM — E83.42 HYPOMAGNESEMIA: Status: ACTIVE | Noted: 2021-02-23

## 2021-02-23 PROBLEM — Z71.89 ADVANCED CARE PLANNING/COUNSELING DISCUSSION: Status: ACTIVE | Noted: 2021-02-23

## 2021-02-23 PROBLEM — R65.21 SEPTIC SHOCK: Status: ACTIVE | Noted: 2021-02-23

## 2021-02-23 PROBLEM — E87.20 LACTIC ACIDOSIS: Status: ACTIVE | Noted: 2021-02-23

## 2021-02-23 PROBLEM — N39.0 URINARY TRACT INFECTION WITHOUT HEMATURIA: Status: ACTIVE | Noted: 2021-02-23

## 2021-02-23 PROBLEM — A41.9 SEPTIC SHOCK: Status: ACTIVE | Noted: 2021-02-23

## 2021-02-23 PROBLEM — I10 HYPERTENSION: Status: ACTIVE | Noted: 2021-02-23

## 2021-02-24 PROBLEM — E87.6 HYPOKALEMIA: Status: ACTIVE | Noted: 2021-02-24

## 2021-02-24 PROBLEM — R78.81 BACTEREMIA DUE TO ESCHERICHIA COLI: Status: ACTIVE | Noted: 2021-02-23

## 2021-02-24 PROBLEM — E87.20 LACTIC ACIDOSIS: Status: RESOLVED | Noted: 2021-02-23 | Resolved: 2021-02-24

## 2021-02-24 PROBLEM — B96.20 BACTEREMIA DUE TO ESCHERICHIA COLI: Status: ACTIVE | Noted: 2021-02-23

## 2021-02-25 PROBLEM — E83.42 HYPOMAGNESEMIA: Status: RESOLVED | Noted: 2021-02-23 | Resolved: 2021-02-25

## 2021-02-26 PROBLEM — J90 BILATERAL PLEURAL EFFUSION: Status: ACTIVE | Noted: 2021-02-26

## 2021-02-27 PROBLEM — N13.2 URETERAL STONE WITH HYDRONEPHROSIS: Status: ACTIVE | Noted: 2021-02-27

## 2021-03-01 PROBLEM — D50.9 IRON DEFICIENCY ANEMIA: Status: ACTIVE | Noted: 2021-03-01

## 2021-03-09 ENCOUNTER — OFFICE VISIT (OUTPATIENT)
Dept: INFECTIOUS DISEASES | Facility: CLINIC | Age: 71
End: 2021-03-09
Payer: MEDICARE

## 2021-03-09 DIAGNOSIS — N30.00 ACUTE CYSTITIS WITHOUT HEMATURIA: ICD-10-CM

## 2021-03-09 DIAGNOSIS — R65.21 SEPTIC SHOCK: Primary | ICD-10-CM

## 2021-03-09 DIAGNOSIS — A41.9 SEPTIC SHOCK: Primary | ICD-10-CM

## 2021-03-09 PROCEDURE — 99213 PR OFFICE/OUTPT VISIT, EST, LEVL III, 20-29 MIN: ICD-10-PCS | Mod: 95,,, | Performed by: HOSPITALIST

## 2021-03-09 PROCEDURE — 99213 OFFICE O/P EST LOW 20 MIN: CPT | Mod: 95,,, | Performed by: HOSPITALIST

## 2021-03-12 ENCOUNTER — TELEPHONE (OUTPATIENT)
Dept: UROLOGY | Facility: CLINIC | Age: 71
End: 2021-03-12

## 2021-03-12 ENCOUNTER — TELEPHONE (OUTPATIENT)
Dept: CARDIOLOGY | Facility: CLINIC | Age: 71
End: 2021-03-12

## 2021-03-12 DIAGNOSIS — N13.2 URETERAL STONE WITH HYDRONEPHROSIS: Primary | ICD-10-CM

## 2021-03-15 ENCOUNTER — TELEPHONE (OUTPATIENT)
Dept: UROLOGY | Facility: CLINIC | Age: 71
End: 2021-03-15

## 2021-03-17 ENCOUNTER — OFFICE VISIT (OUTPATIENT)
Dept: UROLOGY | Facility: CLINIC | Age: 71
End: 2021-03-17
Payer: MEDICARE

## 2021-03-17 ENCOUNTER — HOSPITAL ENCOUNTER (OUTPATIENT)
Dept: RADIOLOGY | Facility: HOSPITAL | Age: 71
Discharge: HOME | End: 2021-03-17
Attending: UROLOGY
Payer: MEDICARE

## 2021-03-17 VITALS — WEIGHT: 143.06 LBS | BODY MASS INDEX: 22.45 KG/M2 | HEIGHT: 67 IN

## 2021-03-17 DIAGNOSIS — N13.2 URETERAL STONE WITH HYDRONEPHROSIS: ICD-10-CM

## 2021-03-17 DIAGNOSIS — N20.1 URETERAL STONE: Primary | ICD-10-CM

## 2021-03-17 PROCEDURE — 99999 PR PBB SHADOW E&M-EST. PATIENT-LVL III: CPT | Mod: PBBFAC,,, | Performed by: UROLOGY

## 2021-03-17 PROCEDURE — 99999 PR PBB SHADOW E&M-EST. PATIENT-LVL III: ICD-10-PCS | Mod: PBBFAC,,, | Performed by: UROLOGY

## 2021-03-17 PROCEDURE — 74018 XR ABDOMEN AP 1 VIEW: ICD-10-PCS | Mod: 26,,, | Performed by: RADIOLOGY

## 2021-03-17 PROCEDURE — 99213 PR OFFICE/OUTPT VISIT, EST, LEVL III, 20-29 MIN: ICD-10-PCS | Mod: S$PBB,,, | Performed by: UROLOGY

## 2021-03-17 PROCEDURE — 74018 RADEX ABDOMEN 1 VIEW: CPT | Mod: 26,,, | Performed by: RADIOLOGY

## 2021-03-17 PROCEDURE — 99213 OFFICE O/P EST LOW 20 MIN: CPT | Mod: PBBFAC,25,PO | Performed by: UROLOGY

## 2021-03-17 PROCEDURE — 99213 OFFICE O/P EST LOW 20 MIN: CPT | Mod: S$PBB,,, | Performed by: UROLOGY

## 2021-03-17 PROCEDURE — 74018 RADEX ABDOMEN 1 VIEW: CPT | Mod: TC,FY,PO

## 2021-03-18 ENCOUNTER — PROCEDURE VISIT (OUTPATIENT)
Dept: UROLOGY | Facility: CLINIC | Age: 71
End: 2021-03-18
Payer: MEDICARE

## 2021-03-18 VITALS — BODY MASS INDEX: 22.45 KG/M2 | HEIGHT: 67 IN | WEIGHT: 143.06 LBS

## 2021-03-18 DIAGNOSIS — N20.1 URETERAL STONE: ICD-10-CM

## 2021-03-18 PROCEDURE — 52310 PR CYSTOSCOPY,REMV CALCULUS,SIMPLE: ICD-10-PCS | Mod: S$PBB,,, | Performed by: UROLOGY

## 2021-03-18 PROCEDURE — 52310 CYSTOSCOPY AND TREATMENT: CPT | Mod: S$PBB,,, | Performed by: UROLOGY

## 2021-03-18 PROCEDURE — 52310 CYSTOSCOPY AND TREATMENT: CPT | Mod: PBBFAC,PO | Performed by: UROLOGY

## 2021-03-23 LAB
COMPN STONE: NORMAL
SPECIMEN SOURCE: NORMAL
STONE ANALYSIS IR-IMP: NORMAL

## 2021-04-09 ENCOUNTER — EXTERNAL HOME HEALTH (OUTPATIENT)
Dept: HOME HEALTH SERVICES | Facility: HOSPITAL | Age: 71
End: 2021-04-09

## 2021-04-13 ENCOUNTER — DOCUMENT SCAN (OUTPATIENT)
Dept: HOME HEALTH SERVICES | Facility: HOSPITAL | Age: 71
End: 2021-04-13

## 2021-05-06 PROBLEM — K62.89 RECTAL PAIN: Status: ACTIVE | Noted: 2021-05-06

## 2021-05-06 PROBLEM — K59.09 CHRONIC CONSTIPATION: Status: ACTIVE | Noted: 2021-05-06

## 2021-05-06 PROBLEM — R19.4 CHANGE IN BOWEL HABIT: Status: ACTIVE | Noted: 2021-05-06

## 2021-05-06 PROBLEM — Z86.010 HISTORY OF COLON POLYPS: Status: ACTIVE | Noted: 2021-05-06

## 2021-05-06 PROBLEM — Z86.0100 HISTORY OF COLON POLYPS: Status: ACTIVE | Noted: 2021-05-06

## 2021-05-06 PROBLEM — L29.0 RECTAL ITCHING: Status: ACTIVE | Noted: 2021-05-06

## 2021-05-07 ENCOUNTER — DOCUMENT SCAN (OUTPATIENT)
Dept: HOME HEALTH SERVICES | Facility: HOSPITAL | Age: 71
End: 2021-05-07

## 2021-05-07 PROBLEM — K59.00 CONSTIPATION: Status: ACTIVE | Noted: 2021-05-07

## 2021-07-15 PROBLEM — B96.20 BACTEREMIA DUE TO ESCHERICHIA COLI: Status: RESOLVED | Noted: 2021-02-23 | Resolved: 2021-07-15

## 2021-07-15 PROBLEM — R78.81 BACTEREMIA DUE TO ESCHERICHIA COLI: Status: RESOLVED | Noted: 2021-02-23 | Resolved: 2021-07-15

## 2021-07-15 PROBLEM — R19.4 CHANGE IN BOWEL HABIT: Status: RESOLVED | Noted: 2021-05-06 | Resolved: 2021-07-15

## 2021-08-02 PROBLEM — R74.8 ABNORMAL LIVER ENZYMES: Status: ACTIVE | Noted: 2021-08-02

## 2021-08-02 PROBLEM — Z91.89 FRAMINGHAM CARDIAC RISK 10-20% IN NEXT 10 YEARS: Status: ACTIVE | Noted: 2021-08-02

## 2022-09-23 ENCOUNTER — TELEPHONE (OUTPATIENT)
Dept: UROLOGY | Facility: CLINIC | Age: 72
End: 2022-09-23
Payer: MEDICARE

## 2022-09-23 NOTE — TELEPHONE ENCOUNTER
----- Message from Dory Trammell sent at 9/23/2022  4:52 PM CDT -----  Contact: pt  Type: requesting call back   Who Called:  pt   Best Call Back Number: [phone]    Additional Information: calling the office to speak to the team in regards to urinalysis and culture with blood in urine ... please call and adv-

## 2022-09-23 NOTE — TELEPHONE ENCOUNTER
In basket message was sent to Sonal COWART And Dilia PEGUERO, to contact patient to discuss test results (urinalysis and culture with blood in urine).

## 2022-09-27 ENCOUNTER — OFFICE VISIT (OUTPATIENT)
Dept: UROLOGY | Facility: CLINIC | Age: 72
End: 2022-09-27
Payer: MEDICARE

## 2022-09-27 DIAGNOSIS — N39.0 RECURRENT UTI: Primary | ICD-10-CM

## 2022-09-27 DIAGNOSIS — N20.0 KIDNEY STONES: ICD-10-CM

## 2022-09-27 PROBLEM — R65.21 SEPTIC SHOCK: Status: RESOLVED | Noted: 2021-02-23 | Resolved: 2022-09-27

## 2022-09-27 PROBLEM — A41.9 SEPTIC SHOCK: Status: RESOLVED | Noted: 2021-02-23 | Resolved: 2022-09-27

## 2022-09-27 PROBLEM — N13.2 URETERAL STONE WITH HYDRONEPHROSIS: Status: RESOLVED | Noted: 2021-02-27 | Resolved: 2022-09-27

## 2022-09-27 PROCEDURE — 99214 OFFICE O/P EST MOD 30 MIN: CPT | Mod: S$PBB,,, | Performed by: STUDENT IN AN ORGANIZED HEALTH CARE EDUCATION/TRAINING PROGRAM

## 2022-09-27 PROCEDURE — 99999 PR PBB SHADOW E&M-EST. PATIENT-LVL III: ICD-10-PCS | Mod: PBBFAC,,, | Performed by: STUDENT IN AN ORGANIZED HEALTH CARE EDUCATION/TRAINING PROGRAM

## 2022-09-27 PROCEDURE — 99213 OFFICE O/P EST LOW 20 MIN: CPT | Mod: PBBFAC,PO | Performed by: STUDENT IN AN ORGANIZED HEALTH CARE EDUCATION/TRAINING PROGRAM

## 2022-09-27 PROCEDURE — 99214 PR OFFICE/OUTPT VISIT, EST, LEVL IV, 30-39 MIN: ICD-10-PCS | Mod: S$PBB,,, | Performed by: STUDENT IN AN ORGANIZED HEALTH CARE EDUCATION/TRAINING PROGRAM

## 2022-09-27 PROCEDURE — 99999 PR PBB SHADOW E&M-EST. PATIENT-LVL III: CPT | Mod: PBBFAC,,, | Performed by: STUDENT IN AN ORGANIZED HEALTH CARE EDUCATION/TRAINING PROGRAM

## 2022-09-27 RX ORDER — ESTRADIOL 0.1 MG/G
CREAM VAGINAL
Qty: 42.5 G | Refills: 0 | Status: SHIPPED | OUTPATIENT
Start: 2022-09-27 | End: 2023-02-13

## 2022-09-27 RX ORDER — PROPYLENE GLYCOL 0.06 MG/ML
EMULSION OPHTHALMIC
COMMUNITY

## 2022-09-27 NOTE — PROGRESS NOTES
"[facility]   Clinic Note    SUBJECTIVE:     Chief Complaint: Hematuria (Pt states she has been having reoccurring UTIs for the past few years. Pt has been uncomfortable. Has noticed blood in urine and pain. No issues with emptying bladder. )    History of Present Illness:  Yamileth Harrell is a 71 y.o. female who presents to clinic for recurrent UTIs. She is established to our clinic and was last seen by Dr. Penn.    Denies dysuria or pressure. She is blind so unclear if she had gross hematuria. Also she has no sense of smell. Her sister states she saw the urine and it was more tea colored and had a foul smell.     Component Urine Culture, Routine   Latest Ref Rng & Units    9/22/2022 PSEUDOMONAS AERUGINOSA (A) . .    9/22/2022 ESCHERICHIA COLI (A) . . .   7/14/2022 ESCHERICHIA COLI (A) . . .   3/15/2022 ESCHERICHIA COLI (A) . . .   2/23/2021 ESCHERICHIA COLI (A) . . .     The urine culture February 2021 is associated with a ureteral stone which was also associated with bacteremia.  She underwent a left ureteral stent insertion and subsequent stent removal with Dr. Penn.    She reports issues with constipation. She drinks prune juice. She has large bowel movements about every 4 days.     Past medical, family, surgical and social history reviewed as documented in chart with pertinent positive medical, family, surgical and social history detailed in HPI.    A review systems was conducted with pertinent positive and negative findings documented in HPI.    OBJECTIVE:     Estimated body mass index is 19.58 kg/m² as calculated from the following:    Height as of 8/22/22: 5' 7" (1.702 m).    Weight as of 8/22/22: 56.7 kg (125 lb).    Vital Signs (Most Recent)       Physical Exam  Vitals and nursing note reviewed.   Constitutional:       General: She is not in acute distress.     Appearance: She is well-developed. She is not ill-appearing or toxic-appearing.   Pulmonary:      Effort: Pulmonary effort is " normal. No accessory muscle usage or respiratory distress.   Neurological:      General: No focal deficit present.      Mental Status: She is alert and oriented to person, place, and time. Mental status is at baseline.   Psychiatric:         Mood and Affect: Mood normal.         Behavior: Behavior normal.         Thought Content: Thought content normal.         Judgment: Judgment normal.       Lab Results   Component Value Date    BUN 13 07/13/2022    CREATININE 0.79 07/13/2022    WBC 6.58 07/16/2021    HGB 13.4 07/16/2021    HCT 40.6 07/16/2021     07/16/2021    AST 32 07/13/2022    ALT 18 07/13/2022    ALKPHOS 95 07/13/2022    ALBUMIN 3.8 07/13/2022      Urine dipstick shows positive for leukocytes, no blood.     ASSESSMENT     1. Recurrent UTI    2. Kidney stones      PLAN:   1. Recurrent UTI  -     US Kidney  -     estradioL (ESTRACE) 0.01 % (0.1 mg/gram) vaginal cream    2. Kidney stones  -     US Kidney    Discussed options for her recurrent UTIs.   General recommendations include increasing fluid intake to 2-3 L of water per day and avoiding constipation. She has taken miralax, lactulose, metamucil. Currently on prune juice. Recommended daily or every other day BMs.  Vaginal estrogen is effective for preventing UTIs in post-menopausal women. She is a candidate for vaginal estrogen. A new Rx was sent to her pharmacy.  Over the counter options include:   D-Mannose 2000 U once a day, or 1000 U twice a day may be used as prophylaxis and may be particularly useful for E coli specific UTIs. There is mixed evidence to support its use but there is overall low risk to this option.   Probiotics with at least 20 billion CFU or foods that improve gut kirti such as yogurt or other fermented foods. There is mixed evidence to support its use and further evidence is required in the future.  Cranberry may be offered as prophylaxis including oral juice and tablet formulations as there is not sufficient evidence to  support one formulation over another. In addition, there is little risk to cranberry supplements, further increasing their appeal to patients. However, it must be noted that fruit juices can be high in sugar content, which limits its use in diabetic patients.  Prophylactic antibiotics are rarely recommended but can be used as a last resort or for post-coital prophylaxis      John Soto MD

## 2022-09-27 NOTE — PATIENT INSTRUCTIONS
Discussed options for her recurrent UTIs.   General recommendations include increasing fluid intake to 2-3 L of water per day and avoiding constipation.  Vaginal estrogen is effective for preventing UTIs in post-menopausal women. She is a candidate for vaginal estrogen.  Over the counter options include:   D-Mannose 2000 U once a day, or 1000 U twice a day may be used as prophylaxis and may be particularly useful for E coli specific UTIs. There is mixed evidence to support its use but there is overall low risk to this option.   Probiotics with at least 20 billion CFU or foods that improve gut kirti such as yogurt or other fermented foods. There is mixed evidence to support its use and further evidence is required in the future.  Cranberry may be offered as prophylaxis including oral juice and tablet formulations as there is not sufficient evidence to support one formulation over another. In addition, there is little risk to cranberry supplements, further increasing their appeal to patients. However, it must be noted that fruit juices can be high in sugar content, which limits its use in diabetic patients.  Prophylactic antibiotics are rarely recommended but can be used as a last resort or for post-coital prophylaxis

## 2022-09-28 ENCOUNTER — HOSPITAL ENCOUNTER (OUTPATIENT)
Dept: RADIOLOGY | Facility: HOSPITAL | Age: 72
Discharge: HOME | End: 2022-09-28
Attending: STUDENT IN AN ORGANIZED HEALTH CARE EDUCATION/TRAINING PROGRAM
Payer: MEDICARE

## 2022-09-28 DIAGNOSIS — N39.0 RECURRENT UTI: ICD-10-CM

## 2022-09-28 DIAGNOSIS — N20.0 KIDNEY STONES: ICD-10-CM

## 2022-09-28 DIAGNOSIS — N13.30 HYDRONEPHROSIS OF LEFT KIDNEY: Primary | ICD-10-CM

## 2022-09-28 PROCEDURE — 76770 US EXAM ABDO BACK WALL COMP: CPT | Mod: 26,,, | Performed by: RADIOLOGY

## 2022-09-28 PROCEDURE — 76770 US EXAM ABDO BACK WALL COMP: CPT | Mod: TC,PO

## 2022-09-28 PROCEDURE — 76770 US KIDNEY: ICD-10-PCS | Mod: 26,,, | Performed by: RADIOLOGY

## 2022-09-30 ENCOUNTER — TELEPHONE (OUTPATIENT)
Dept: UROLOGY | Facility: CLINIC | Age: 72
End: 2022-09-30
Payer: MEDICARE

## 2022-09-30 NOTE — TELEPHONE ENCOUNTER
----- Message from John Soto MD sent at 9/28/2022  3:23 PM CDT -----  Ultrasound showed mild hydronephrosis on the left.  The patient needs a CT renal stone study.  I am placing the orders now.  Please let the patient know and arrange the scan and then follow-up with me afterwards.

## 2022-09-30 NOTE — TELEPHONE ENCOUNTER
Spoke to the sister and she verbally understood that her sister has an appt for a CT RSS at [facility] and a f/u appt with Dr Soto on 10-11-22.

## 2022-10-07 ENCOUNTER — TELEPHONE (OUTPATIENT)
Dept: UROLOGY | Facility: CLINIC | Age: 72
End: 2022-10-07
Payer: MEDICARE

## 2022-10-11 ENCOUNTER — TELEPHONE (OUTPATIENT)
Dept: UROLOGY | Facility: CLINIC | Age: 72
End: 2022-10-11
Payer: MEDICARE

## 2022-10-11 NOTE — TELEPHONE ENCOUNTER
----- Message from Jeaneth Will MA sent at 10/11/2022  8:42 AM CDT -----  Contact: pt at [phone]  Pt returned call to schedule procedure.  ----- Message -----  From: Rivas Thao  Sent: 10/10/2022  11:54 AM CDT  To: [facility] Staff    Type:  Patient Returning Call    Who Called:  pt  Who Left Message for Patient:  Sondra  Does the patient know what this is regarding?:  yes  Best Call Back Number:  [phone]  Additional Information:  Please call back to advise.

## 2022-10-11 NOTE — TELEPHONE ENCOUNTER
Spoke with patient , advised nurse will have to speak to patient in regards to scheduling procedure. Patient report she has appointment today to discuss ct , will speak with Dr Soto regarding . Patient expressed understanding.

## 2022-10-11 NOTE — TELEPHONE ENCOUNTER
----- Message from Edna Cortez sent at 10/11/2022  3:01 PM CDT -----  Contact: Jeri at [phone]  Type: Needs Medical Advice  Who Called:  pt's sister Jeri    Best Call Back Number: [phone]    Additional Information: pt's sister is calling the office requesting a back. Please call back and advise.

## 2022-10-11 NOTE — TELEPHONE ENCOUNTER
Returned pt call and spoke with pt sister Jeri.  Pt sister stated she had a appt to discuss CT.  After speaking with Sondra, pt sister stated Sondra was suppose to check with Dr. Soto as to what he recommends and if the pt needs a procedure.  She also requesting if the pt needs a stent or not.  Pt sister stated pt would rather try a stent first if possible.  If pt needs surgery 10/24 -10/28, this is the only time she can schedule at this time.  Pt sister Jeri would like to speak with Dr. Soto, if possible.  Please contact pt and advise.    Thank you

## 2022-10-12 ENCOUNTER — TELEPHONE (OUTPATIENT)
Dept: UROLOGY | Facility: CLINIC | Age: 72
End: 2022-10-12
Payer: MEDICARE

## 2022-10-14 ENCOUNTER — TELEPHONE (OUTPATIENT)
Dept: UROLOGY | Facility: CLINIC | Age: 72
End: 2022-10-14
Payer: MEDICARE

## 2022-10-14 DIAGNOSIS — N13.30 HYDRONEPHROSIS OF LEFT KIDNEY: Primary | ICD-10-CM

## 2022-10-19 ENCOUNTER — TELEPHONE (OUTPATIENT)
Dept: UROLOGY | Facility: CLINIC | Age: 72
End: 2022-10-19
Payer: MEDICARE

## 2022-10-19 RX ORDER — CHOLECALCIFEROL (VITAMIN D3) 25 MCG
1000 TABLET ORAL DAILY
COMMUNITY

## 2022-10-19 NOTE — TELEPHONE ENCOUNTER
Spoke with patient , advised she has been cleared for upcoming surgery , ok to stop aspirin 5-7 days prior to upcoming procedure, patient expressed understanding.

## 2022-10-20 ENCOUNTER — TELEPHONE (OUTPATIENT)
Dept: UROLOGY | Facility: CLINIC | Age: 72
End: 2022-10-20
Payer: MEDICARE

## 2022-10-20 NOTE — TELEPHONE ENCOUNTER
Spoke with Dr Soto , after reviewing patient recent cardiology notes  , patient will need to be cleared completely from a cardiac standpoint prior to Vasectomy with anesthesia . Patient Vasectomy has been cancelled at this time , patient expressed understanding.

## 2022-10-21 ENCOUNTER — ANESTHESIA EVENT (OUTPATIENT)
Dept: SURGERY | Facility: HOSPITAL | Age: 72
End: 2022-10-21
Payer: MEDICARE

## 2022-10-23 ENCOUNTER — NURSE TRIAGE (OUTPATIENT)
Dept: ADMINISTRATIVE | Facility: CLINIC | Age: 72
End: 2022-10-23
Payer: MEDICARE

## 2022-10-23 PROBLEM — N20.1 LEFT URETERAL STONE: Status: ACTIVE | Noted: 2022-10-23

## 2022-10-24 ENCOUNTER — HOSPITAL ENCOUNTER (OUTPATIENT)
Dept: RADIOLOGY | Facility: HOSPITAL | Age: 72
Discharge: HOME | End: 2022-10-24
Attending: STUDENT IN AN ORGANIZED HEALTH CARE EDUCATION/TRAINING PROGRAM
Payer: MEDICARE

## 2022-10-24 ENCOUNTER — HOSPITAL ENCOUNTER (OUTPATIENT)
Facility: HOSPITAL | Age: 72
Discharge: HOME | End: 2022-10-24
Attending: STUDENT IN AN ORGANIZED HEALTH CARE EDUCATION/TRAINING PROGRAM | Admitting: STUDENT IN AN ORGANIZED HEALTH CARE EDUCATION/TRAINING PROGRAM
Payer: MEDICARE

## 2022-10-24 ENCOUNTER — ANESTHESIA (OUTPATIENT)
Dept: SURGERY | Facility: HOSPITAL | Age: 72
End: 2022-10-24
Payer: MEDICARE

## 2022-10-24 VITALS
SYSTOLIC BLOOD PRESSURE: 89 MMHG | RESPIRATION RATE: 16 BRPM | HEART RATE: 57 BPM | HEIGHT: 67 IN | WEIGHT: 125 LBS | DIASTOLIC BLOOD PRESSURE: 50 MMHG | OXYGEN SATURATION: 96 % | TEMPERATURE: 98 F | BODY MASS INDEX: 19.62 KG/M2

## 2022-10-24 DIAGNOSIS — N20.1 LEFT URETERAL STONE: Primary | ICD-10-CM

## 2022-10-24 DIAGNOSIS — Z98.890 HISTORY OF CYSTOSCOPY: ICD-10-CM

## 2022-10-24 PROCEDURE — 25000003 PHARM REV CODE 250: Mod: PO | Performed by: ANESTHESIOLOGY

## 2022-10-24 PROCEDURE — C2617 STENT, NON-COR, TEM W/O DEL: HCPCS | Mod: PO | Performed by: STUDENT IN AN ORGANIZED HEALTH CARE EDUCATION/TRAINING PROGRAM

## 2022-10-24 PROCEDURE — 36000706: Mod: PO | Performed by: STUDENT IN AN ORGANIZED HEALTH CARE EDUCATION/TRAINING PROGRAM

## 2022-10-24 PROCEDURE — 52332 CYSTOSCOPY AND TREATMENT: CPT | Mod: LT,,, | Performed by: STUDENT IN AN ORGANIZED HEALTH CARE EDUCATION/TRAINING PROGRAM

## 2022-10-24 PROCEDURE — D9220A PRA ANESTHESIA: ICD-10-PCS | Mod: ANES,,, | Performed by: ANESTHESIOLOGY

## 2022-10-24 PROCEDURE — 37000009 HC ANESTHESIA EA ADD 15 MINS: Mod: PO | Performed by: STUDENT IN AN ORGANIZED HEALTH CARE EDUCATION/TRAINING PROGRAM

## 2022-10-24 PROCEDURE — 63600175 PHARM REV CODE 636 W HCPCS: Mod: PO | Performed by: STUDENT IN AN ORGANIZED HEALTH CARE EDUCATION/TRAINING PROGRAM

## 2022-10-24 PROCEDURE — 25500020 PHARM REV CODE 255: Mod: PO | Performed by: STUDENT IN AN ORGANIZED HEALTH CARE EDUCATION/TRAINING PROGRAM

## 2022-10-24 PROCEDURE — C1769 GUIDE WIRE: HCPCS | Mod: PO | Performed by: STUDENT IN AN ORGANIZED HEALTH CARE EDUCATION/TRAINING PROGRAM

## 2022-10-24 PROCEDURE — 52332 PR CYSTOSCOPY,INSERT URETERAL STENT: ICD-10-PCS | Mod: LT,,, | Performed by: STUDENT IN AN ORGANIZED HEALTH CARE EDUCATION/TRAINING PROGRAM

## 2022-10-24 PROCEDURE — D9220A PRA ANESTHESIA: Mod: ANES,,, | Performed by: ANESTHESIOLOGY

## 2022-10-24 PROCEDURE — 37000008 HC ANESTHESIA 1ST 15 MINUTES: Mod: PO | Performed by: STUDENT IN AN ORGANIZED HEALTH CARE EDUCATION/TRAINING PROGRAM

## 2022-10-24 PROCEDURE — 76000 FLUOROSCOPY <1 HR PHYS/QHP: CPT | Mod: TC,PO

## 2022-10-24 PROCEDURE — 74420 PR  X-RAY RETROGRADE PYELOGRAM: ICD-10-PCS | Mod: 26,,, | Performed by: STUDENT IN AN ORGANIZED HEALTH CARE EDUCATION/TRAINING PROGRAM

## 2022-10-24 PROCEDURE — 71000015 HC POSTOP RECOV 1ST HR: Mod: PO | Performed by: STUDENT IN AN ORGANIZED HEALTH CARE EDUCATION/TRAINING PROGRAM

## 2022-10-24 PROCEDURE — D9220A PRA ANESTHESIA: ICD-10-PCS | Mod: CRNA,,, | Performed by: NURSE ANESTHETIST, CERTIFIED REGISTERED

## 2022-10-24 PROCEDURE — D9220A PRA ANESTHESIA: Mod: CRNA,,, | Performed by: NURSE ANESTHETIST, CERTIFIED REGISTERED

## 2022-10-24 PROCEDURE — 63600175 PHARM REV CODE 636 W HCPCS: Mod: PO | Performed by: ANESTHESIOLOGY

## 2022-10-24 PROCEDURE — 36000707: Mod: PO | Performed by: STUDENT IN AN ORGANIZED HEALTH CARE EDUCATION/TRAINING PROGRAM

## 2022-10-24 PROCEDURE — 74420 UROGRAPHY RTRGR +-KUB: CPT | Mod: 26,,, | Performed by: STUDENT IN AN ORGANIZED HEALTH CARE EDUCATION/TRAINING PROGRAM

## 2022-10-24 PROCEDURE — 25000003 PHARM REV CODE 250: Mod: PO | Performed by: NURSE ANESTHETIST, CERTIFIED REGISTERED

## 2022-10-24 PROCEDURE — 63600175 PHARM REV CODE 636 W HCPCS: Mod: PO | Performed by: NURSE ANESTHETIST, CERTIFIED REGISTERED

## 2022-10-24 PROCEDURE — C1758 CATHETER, URETERAL: HCPCS | Mod: PO | Performed by: STUDENT IN AN ORGANIZED HEALTH CARE EDUCATION/TRAINING PROGRAM

## 2022-10-24 PROCEDURE — 71000033 HC RECOVERY, INTIAL HOUR: Mod: PO | Performed by: STUDENT IN AN ORGANIZED HEALTH CARE EDUCATION/TRAINING PROGRAM

## 2022-10-24 DEVICE — STENT URETERAL UNIV 6FR 28CM: Type: IMPLANTABLE DEVICE | Site: URETER | Status: FUNCTIONAL

## 2022-10-24 RX ORDER — PHENYLEPHRINE HYDROCHLORIDE 10 MG/ML
INJECTION INTRAVENOUS
Status: DISCONTINUED | OUTPATIENT
Start: 2022-10-24 | End: 2022-10-24

## 2022-10-24 RX ORDER — LIDOCAINE HYDROCHLORIDE 10 MG/ML
1 INJECTION, SOLUTION EPIDURAL; INFILTRATION; INTRACAUDAL; PERINEURAL ONCE
Status: COMPLETED | OUTPATIENT
Start: 2022-10-24 | End: 2022-10-24

## 2022-10-24 RX ORDER — PROPOFOL 10 MG/ML
VIAL (ML) INTRAVENOUS CONTINUOUS PRN
Status: DISCONTINUED | OUTPATIENT
Start: 2022-10-24 | End: 2022-10-24

## 2022-10-24 RX ORDER — METOCLOPRAMIDE HYDROCHLORIDE 5 MG/ML
10 INJECTION INTRAMUSCULAR; INTRAVENOUS EVERY 10 MIN PRN
Status: DISCONTINUED | OUTPATIENT
Start: 2022-10-24 | End: 2022-10-24 | Stop reason: HOSPADM

## 2022-10-24 RX ORDER — PROPOFOL 10 MG/ML
VIAL (ML) INTRAVENOUS
Status: DISCONTINUED | OUTPATIENT
Start: 2022-10-24 | End: 2022-10-24

## 2022-10-24 RX ORDER — ACETAMINOPHEN 10 MG/ML
INJECTION, SOLUTION INTRAVENOUS
Status: DISCONTINUED | OUTPATIENT
Start: 2022-10-24 | End: 2022-10-24

## 2022-10-24 RX ORDER — OXYCODONE HYDROCHLORIDE 5 MG/1
5 TABLET ORAL ONCE AS NEEDED
Status: DISCONTINUED | OUTPATIENT
Start: 2022-10-24 | End: 2022-10-24 | Stop reason: HOSPADM

## 2022-10-24 RX ORDER — LIDOCAINE HYDROCHLORIDE 20 MG/ML
INJECTION INTRAVENOUS
Status: DISCONTINUED | OUTPATIENT
Start: 2022-10-24 | End: 2022-10-24

## 2022-10-24 RX ORDER — DIPHENHYDRAMINE HYDROCHLORIDE 50 MG/ML
INJECTION INTRAMUSCULAR; INTRAVENOUS
Status: DISCONTINUED | OUTPATIENT
Start: 2022-10-24 | End: 2022-10-24

## 2022-10-24 RX ORDER — CIPROFLOXACIN 2 MG/ML
400 INJECTION, SOLUTION INTRAVENOUS
Status: COMPLETED | OUTPATIENT
Start: 2022-10-24 | End: 2022-10-24

## 2022-10-24 RX ORDER — SODIUM CHLORIDE, SODIUM LACTATE, POTASSIUM CHLORIDE, CALCIUM CHLORIDE 600; 310; 30; 20 MG/100ML; MG/100ML; MG/100ML; MG/100ML
INJECTION, SOLUTION INTRAVENOUS CONTINUOUS
Status: DISCONTINUED | OUTPATIENT
Start: 2022-10-24 | End: 2022-10-24 | Stop reason: HOSPADM

## 2022-10-24 RX ORDER — CIPROFLOXACIN 500 MG/1
500 TABLET ORAL 2 TIMES DAILY
Qty: 20 TABLET | Refills: 0 | Status: SHIPPED | OUTPATIENT
Start: 2022-10-24 | End: 2022-11-03

## 2022-10-24 RX ORDER — SODIUM CHLORIDE 9 MG/ML
INJECTION, SOLUTION INTRAVENOUS CONTINUOUS
Status: DISCONTINUED | OUTPATIENT
Start: 2022-10-24 | End: 2022-10-24 | Stop reason: HOSPADM

## 2022-10-24 RX ORDER — SODIUM CHLORIDE 0.9 % (FLUSH) 0.9 %
3 SYRINGE (ML) INJECTION
Status: DISCONTINUED | OUTPATIENT
Start: 2022-10-24 | End: 2022-10-24 | Stop reason: HOSPADM

## 2022-10-24 RX ORDER — FENTANYL CITRATE 50 UG/ML
INJECTION, SOLUTION INTRAMUSCULAR; INTRAVENOUS
Status: DISCONTINUED | OUTPATIENT
Start: 2022-10-24 | End: 2022-10-24

## 2022-10-24 RX ORDER — ONDANSETRON 2 MG/ML
INJECTION INTRAMUSCULAR; INTRAVENOUS
Status: DISCONTINUED | OUTPATIENT
Start: 2022-10-24 | End: 2022-10-24

## 2022-10-24 RX ORDER — MIDAZOLAM HYDROCHLORIDE 1 MG/ML
INJECTION, SOLUTION INTRAMUSCULAR; INTRAVENOUS
Status: DISCONTINUED | OUTPATIENT
Start: 2022-10-24 | End: 2022-10-24

## 2022-10-24 RX ADMIN — CIPROFLOXACIN 400 MG: 2 INJECTION, SOLUTION INTRAVENOUS at 09:10

## 2022-10-24 RX ADMIN — SODIUM CHLORIDE, SODIUM LACTATE, POTASSIUM CHLORIDE, AND CALCIUM CHLORIDE: .6; .31; .03; .02 INJECTION, SOLUTION INTRAVENOUS at 09:10

## 2022-10-24 RX ADMIN — LIDOCAINE HYDROCHLORIDE 10 MG: 10 INJECTION, SOLUTION EPIDURAL; INFILTRATION; INTRACAUDAL; PERINEURAL at 09:10

## 2022-10-24 RX ADMIN — MIDAZOLAM HYDROCHLORIDE 1 MG: 1 INJECTION, SOLUTION INTRAMUSCULAR; INTRAVENOUS at 09:10

## 2022-10-24 RX ADMIN — PROPOFOL 100 MCG/KG/MIN: 10 INJECTION, EMULSION INTRAVENOUS at 09:10

## 2022-10-24 RX ADMIN — PHENYLEPHRINE HYDROCHLORIDE 50 MCG: 10 INJECTION INTRAVENOUS at 09:10

## 2022-10-24 RX ADMIN — LIDOCAINE HYDROCHLORIDE 60 MG: 20 INJECTION INTRAVENOUS at 09:10

## 2022-10-24 RX ADMIN — ACETAMINOPHEN 1000 MG: 10 INJECTION, SOLUTION INTRAVENOUS at 09:10

## 2022-10-24 RX ADMIN — DIPHENHYDRAMINE HYDROCHLORIDE 6.25 MG: 50 INJECTION INTRAMUSCULAR; INTRAVENOUS at 09:10

## 2022-10-24 RX ADMIN — ONDANSETRON 4 MG: 2 INJECTION, SOLUTION INTRAMUSCULAR; INTRAVENOUS at 09:10

## 2022-10-24 RX ADMIN — PROPOFOL 50 MG: 10 INJECTION, EMULSION INTRAVENOUS at 09:10

## 2022-10-24 RX ADMIN — FENTANYL CITRATE 50 MCG: 50 INJECTION, SOLUTION INTRAMUSCULAR; INTRAVENOUS at 09:10

## 2022-10-24 NOTE — ANESTHESIA POSTPROCEDURE EVALUATION
Anesthesia Post Evaluation    Patient: Yamileth Harrell    Procedure(s) Performed: Procedure(s) (LRB):  CYSTOSCOPY (N/A)  CYSTOSCOPY, WITH URETERAL STENT INSERTION (Left)  CYSTOSCOPY, WITH RETROGRADE PYELOGRAM (Left)    Final Anesthesia Type: general      Patient location during evaluation: PACU  Patient participation: Yes- Able to Participate  Level of consciousness: awake and alert and oriented  Post-procedure vital signs: reviewed and stable  Pain management: adequate  Airway patency: patent    PONV status at discharge: No PONV  Anesthetic complications: no      Cardiovascular status: blood pressure returned to baseline and stable  Respiratory status: unassisted and spontaneous ventilation  Hydration status: euvolemic  Follow-up not needed.          Vitals Value Taken Time   BP 84/54 10/24/22 1015   Temp 36.7 °C (98 °F) 10/24/22 1000   Pulse 55 10/24/22 1015   Resp 12 10/24/22 1015   SpO2 100 % 10/24/22 1015         No case tracking events are documented in the log.      Pain/Dexter Score: Dexter Score: 9 (10/24/2022 10:16 AM)

## 2022-10-24 NOTE — ANESTHESIA PREPROCEDURE EVALUATION
10/24/2022  Yamileth Harrell is a 72 y.o., female.      Pre-op Assessment    I have reviewed the Patient Summary Reports.     I have reviewed the Nursing Notes. I have reviewed the NPO Status.   I have reviewed the Medications.     Review of Systems  Anesthesia Hx:  No problems with previous Anesthesia    Social:  Former Smoker    Cardiovascular:   Hypertension, well controlled CAD asymptomatic CABG/stent (stent long ago)  hyperlipidemia    Pulmonary:  Pulmonary Normal    Renal/:   Chronic Renal Disease    Musculoskeletal:   Arthritis  DDD   Neurological:  Neurology Normal Legally blind R   Endocrine:   Hypothyroidism    Psych:   Psychiatric History (schizoaffective disorder)          Physical Exam  General: Well nourished, Cooperative, Alert and Oriented    Airway:  Mallampati: II   Mouth Opening: Normal  TM Distance: Normal  Neck ROM: Normal ROM    Dental:  Dentures        Anesthesia Plan  Type of Anesthesia, risks & benefits discussed:    Anesthesia Type: Gen ETT, Gen Supraglottic Airway, Gen Natural Airway, MAC  Intra-op Monitoring Plan: Standard ASA Monitors  Post Op Pain Control Plan: multimodal analgesia  Induction:  IV  Airway Plan: Direct, Video and Fiberoptic, Post-Induction  Informed Consent: Informed consent signed with the Patient and all parties understand the risks and agree with anesthesia plan.  All questions answered.   ASA Score: 3    Ready For Surgery From Anesthesia Perspective.     .

## 2022-10-24 NOTE — OP NOTE
[facility]  Urology Department  Operative Note    Date of Procedure: 10/24/2022     Pre-Operative Diagnosis:   Hydronephrosis of left kidney [N13.30], recurrent UTIs    Post-Operative Diagnosis: same    Procedure:   Cystoscopy with left ureteral stent insertion  Left retrograde pyelogram  Fluoro less than 1 hour    Primary: John Soto MD    Assisting Surgeon: None    Anesthesia: Local MAC    Estimated Blood Loss (EBL): min     Drains:  6 Monegasque by 28 cm double-J ureteral stent without strings    Specimens: none    Indications:   Yamileth Harrell is a 72 y.o. female with a history of a left ureteral stone and a history of recurrent UTIs.  She had hydronephrosis noted on ultrasound and subsequent CT renal stone study demonstrated a proximal ureteral stone.  Given her history of UTIs I recommend a period of stent drainage prior to definitive ureteroscopy. After the risks, benefits, and alternatives were discussed and all questions were answered to her satisfaction, she elected to proceed with surgery.    Operative Findings:  Debris noted within the bladder, some difficulty advancing the wire past the stone requiring the use of a 5 Monegasque open-ended ureteral catheter.  Attempted a 24 cm stent which was too short, 26 cm stent was also too short, ultimately a 6 Monegasque by 28 cm ureteral stent was inserted in correct position    Description of the Procedure:     Prior to proceeding to the operating room, the procedure was described in detail to the patient, all questions were answered and appropriate consent was obtained. The patient was then taken to the operating room. TEDs and SCDs were applied and confirmed to be working. Anesthesia was induced and the patient was placed in the lithotomy position. The patient was then prepped in standard sterile fashion. Time-out was held and perioperative antibiotics were confirmed and administered.    A 22 Monegasque rigid sheath was inserted per the urethra.  There were no  abnormalities noted within the bladder lumen aside from debris within the bladder.  There were no mucosal abnormalities.  Our attention was turned the left ureteral orifice.  A motion wire was used to cannulate the left ureteral orifice.  Using intermittent fluoroscopy the wire was advanced up the ureter.  The wire was unable to be passed alongside the ureteral stone.  A 5 Micronesian open-ended ureteral catheter was then used to buttress the wire, and after this the wire was able to pass up into the renal pelvis.  A 6 Micronesian by 24 cm double-J ureteral stent was then placed over the wire.  This was noted to be too short proximally.  The stent was removed and the wire was replaced.  A 5 Micronesian was then replaced for the wire, and a retrograde pyelogram was performed delineating the renal pelvis.  The wire was then exchanged back for the 5 Micronesian.  I attempted to place a 6 Micronesian by 26 cm double-J ureteral stent, however this was also noted to be too short.  Again the wire was exchanged, and a 6 Micronesian by 28 cm double-J ureteral stent was then inserted into the renal pelvis.  The wire was removed and there were good coils both proximally and distally.  This was noted to be more appropriate in length.  There was efflux noted from the stent. The bladder was drained.    The patient tolerated the procedure well. No complications occurred during or immediately after the procedure. The patient was taken to the PACU satisfactory condition.     Disposition: The patient will be discharged home after voiding.  She will take a 10 day course of Cipro.  She will follow up for outpatient ureteroscopy for definitive stone management.    John Soto MD

## 2022-10-24 NOTE — INTERVAL H&P NOTE
The patient has been examined and the H&P has been reviewed:    I concur with the findings and no changes have occurred since H&P was written.    Surgery risks, benefits and alternative options discussed and understood by patient/family.          Active Hospital Problems    Diagnosis  POA    *Left ureteral stone [N20.1]  Yes      Resolved Hospital Problems   No resolved problems to display.

## 2022-10-24 NOTE — DISCHARGE SUMMARY
[facility]  Discharge Note  Short Stay    Admit Date: 10/24/2022    Discharge Date and Time: 10/24/2022 10:09 AM      Attending Physician: John Soto MD     Discharge Provider: John Soto MD    Diagnoses:  Active Hospital Problems    Diagnosis  POA    *Left ureteral stone [N20.1]  Yes      Resolved Hospital Problems   No resolved problems to display.       Discharged Condition: stable    Hospital Course: Patient was admitted for left ureteral stent insertion and tolerated the procedure well with no complications. She was discharged home in good condition on the same day.       Final Diagnoses: Same as principal problem.    Disposition: Home or Self Care    Follow up/Patient Instructions:    Medications:  Reconciled Home Medications:   Current Discharge Medication List        START taking these medications    Details   ciprofloxacin HCl (CIPRO) 500 MG tablet Take 1 tablet (500 mg total) by mouth 2 (two) times a day. for 10 days  Qty: 20 tablet, Refills: 0           CONTINUE these medications which have NOT CHANGED    Details   ascorbic acid, vitamin C, (VITAMIN C) 500 MG tablet Take 1 tablet (500 mg total) by mouth once daily.      aspirin (ECOTRIN) 81 MG EC tablet Take 1 tablet (81 mg total) by mouth once daily.    Associated Diagnoses: Mixed hyperlipidemia      busPIRone (BUSPAR) 5 MG Tab Take 1 tablet (5 mg total) by mouth 3 (three) times daily.    Associated Diagnoses: Other schizophrenia      levothyroxine (SYNTHROID) 75 MCG tablet TAKE 1 TABLET BY MOUTH BEFORE BREAKFAST  Qty: 90 tablet, Refills: 2    Comments: Maximum Refills Reached  Associated Diagnoses: Hypothyroidism due to acquired atrophy of thyroid      metoprolol succinate (TOPROL-XL) 25 MG 24 hr tablet TAKE 1 TABLET BY MOUTH DAILY  Qty: 90 tablet, Refills: 2    Comments: Maximum Refills Reached  Associated Diagnoses: Atherosclerosis of native coronary artery of native heart without angina pectoris      multivitamin (THERAGRAN) per tablet  Take 1 tablet by mouth once daily.      OLANZapine (ZYPREXA) 20 MG tablet Take 1.5 tablets (30 mg total) by mouth nightly. As per psychiatrist.    Associated Diagnoses: Other schizophrenia      rosuvastatin (CRESTOR) 10 MG tablet TAKE 1 TABLET BY MOUTH EVERY EVENING  Qty: 90 tablet, Refills: 3    Comments: Maximum Refills Reached  Associated Diagnoses: Mixed hyperlipidemia      vitamin B complex (B-COMPLEX ORAL) Take 1 tablet by mouth once daily.       vitamin D (VITAMIN D3) 1000 units Tab Take 1,000 Units by mouth once daily.      estradioL (ESTRACE) 0.01 % (0.1 mg/gram) vaginal cream Place 2 g vaginally once daily for 7 days, THEN 1 g once daily for 7 days, THEN 1 g twice a week.  Qty: 42.5 g, Refills: 0    Associated Diagnoses: Recurrent UTI      hydrocortisone (ANUSOL-HC) 2.5 % rectal cream Place rectally 2 (two) times daily.       propylene glycoL (SYSTANE BALANCE) 0.6 % Drop       XIIDRA 5 % Dpet Place 1 drop into both eyes 2 (two) times a day.   Refills: 6           Discharge Procedure Orders   No dressing needed     Notify your health care provider if you experience any of the following:  temperature >100.4     Notify your health care provider if you experience any of the following:  persistent nausea and vomiting or diarrhea     Notify your health care provider if you experience any of the following:  severe uncontrolled pain     Notify your health care provider if you experience any of the following:  difficulty breathing or increased cough     Notify your health care provider if you experience any of the following:  severe persistent headache     Notify your health care provider if you experience any of the following:  worsening rash     Notify your health care provider if you experience any of the following:  persistent dizziness, light-headedness, or visual disturbances     Activity as tolerated

## 2022-10-24 NOTE — TRANSFER OF CARE
"Anesthesia Transfer of Care Note    Patient: Yamileth Harrell    Procedure(s) Performed: Procedure(s) (LRB):  CYSTOSCOPY (N/A)  CYSTOSCOPY, WITH URETERAL STENT INSERTION (Left)  CYSTOSCOPY, WITH RETROGRADE PYELOGRAM (Left)    Patient location: PACU    Anesthesia Type: MAC    Transport from OR: Transported from OR on 2-3 L/min O2 by NC with adequate spontaneous ventilation    Post pain: adequate analgesia    Post assessment: no apparent anesthetic complications and tolerated procedure well    Post vital signs: stable    Level of consciousness: sedated    Nausea/Vomiting: no nausea/vomiting    Complications: none    Transfer of care protocol was followed      Last vitals:   Visit Vitals  BP (!) 99/58 (BP Location: Right arm, Patient Position: Lying)   Pulse 61   Temp 36.7 °C (98 °F) (Temporal)   Resp 17   Ht 5' 7" (1.702 m)   Wt 56.7 kg (125 lb)   SpO2 98%   Breastfeeding No   BMI 19.58 kg/m²     "

## 2022-10-26 ENCOUNTER — TELEPHONE (OUTPATIENT)
Dept: UROLOGY | Facility: CLINIC | Age: 72
End: 2022-10-26
Payer: MEDICARE

## 2022-10-26 NOTE — TELEPHONE ENCOUNTER
----- Message from Warren Lucas sent at 10/26/2022 10:10 AM CDT -----  Type:  Sooner Appointment Request    Caller is requesting a sooner appointment.  Caller declined first available appointment listed below.  Caller will not accept being placed on the waitlist and is requesting a message be sent to doctor.    Name of Caller:  Sister/ Jeri NOLASCO   When is the first available appointment?  02/24/22  Symptoms:  Kidney stone-- stent removal  Best Call Back Number:  [phone]  Additional Information:

## 2022-10-31 ENCOUNTER — TELEPHONE (OUTPATIENT)
Dept: UROLOGY | Facility: CLINIC | Age: 72
End: 2022-10-31
Payer: MEDICARE

## 2022-10-31 DIAGNOSIS — N20.1 URETERAL STONE: Primary | ICD-10-CM

## 2022-10-31 NOTE — TELEPHONE ENCOUNTER
Spoke with Jeri , patient care taker advised procedure scheduled for 11/10/2022 at [facility] , with Dr Soto. Patient care taker expressed understanding.

## 2022-11-08 ENCOUNTER — TELEPHONE (OUTPATIENT)
Dept: UROLOGY | Facility: CLINIC | Age: 72
End: 2022-11-08
Payer: MEDICARE

## 2022-11-08 NOTE — TELEPHONE ENCOUNTER
Called pt sister Jeri and she v/u of Dr. Soto's message, he called in medication to pharmacy and if pt is having low grad fevers she should be evaluated in the ED.

## 2022-11-08 NOTE — TELEPHONE ENCOUNTER
----- Message from John Soto MD sent at 11/8/2022 10:55 AM CST -----  Regarding: RE: Pharmacy change  Please let her know I sent the medication to the [facility] pharmacy.  If the patient is still having significant confusion and low-grade fevers, they should be evaluated in the ER.  ----- Message -----  From: Jeaneth Will MA  Sent: 11/8/2022   9:39 AM CST  To: John Soto MD  Subject: Pharmacy change                                  Dr Soto pt sister stated she would like medication (Cipro) called in to [facility] [address] [place], [place] phone: [phone] and NOT [facility].  ----- Message -----  From: Sangeetha Moon  Sent: 11/8/2022   9:19 AM CST  To: [facility] Staff  Subject: Pt's Sister Jeri Benton called to speak to#    Rx Refill Request:    Pt's Sister Jeri Benton called to speak to the nurse regarding medication that was supposed to be sent to [facility] but was sent to a different pharmacy. Pt needs an Rx Refill for ciprofloxacin HCl (CIPRO) 500 MG tablet 2XDay.    Pt would like the Rx sent to her local: [facility] located at [address] in [place] phone: [phone]    Ms Harrell would like a call back to confirm that the request was changed to the correct location

## 2022-11-08 NOTE — TELEPHONE ENCOUNTER
----- Message from Sangeetha Moon sent at 11/8/2022  9:08 AM CST -----  Regarding: Pt's Sister Jeri Benton called to speak to the nurse regarding medication that was supposed to be sent to [facility] but was sent to a different pharmacy  Rx Refill Request:    Pt's Sister Jeri Benton called to speak to the nurse regarding medication that was supposed to be sent to [facility] but was sent to a different pharmacy. Pt needs an Rx Refill for ciprofloxacin HCl (CIPRO) 500 MG tablet 2XDay.    Pt would like the Rx sent to her local: [facility] located at [address] [place] [address] in [place] phone: [phone]    Ms Harrell would like a call back to confirm that the request was changed to the correct location

## 2023-02-06 ENCOUNTER — HOSPITAL ENCOUNTER (OUTPATIENT)
Dept: RADIOLOGY | Facility: HOSPITAL | Age: 73
Discharge: HOME | End: 2023-02-06
Attending: STUDENT IN AN ORGANIZED HEALTH CARE EDUCATION/TRAINING PROGRAM
Payer: MEDICARE

## 2023-02-06 DIAGNOSIS — N13.30 HYDRONEPHROSIS OF LEFT KIDNEY: ICD-10-CM

## 2023-02-06 PROCEDURE — 76770 US EXAM ABDO BACK WALL COMP: CPT | Mod: TC,PO

## 2023-02-06 PROCEDURE — 76770 US RETROPERITONEAL COMPLETE: ICD-10-PCS | Mod: 26,,, | Performed by: RADIOLOGY

## 2023-02-06 PROCEDURE — 76770 US EXAM ABDO BACK WALL COMP: CPT | Mod: 26,,, | Performed by: RADIOLOGY

## 2023-02-13 ENCOUNTER — OFFICE VISIT (OUTPATIENT)
Dept: UROLOGY | Facility: CLINIC | Age: 73
End: 2023-02-13
Payer: MEDICARE

## 2023-02-13 VITALS — HEIGHT: 67 IN | WEIGHT: 132.69 LBS | BODY MASS INDEX: 20.83 KG/M2

## 2023-02-13 DIAGNOSIS — N13.30 HYDRONEPHROSIS OF LEFT KIDNEY: Primary | ICD-10-CM

## 2023-02-13 DIAGNOSIS — N39.0 RECURRENT UTI: ICD-10-CM

## 2023-02-13 PROCEDURE — 99999 PR PBB SHADOW E&M-EST. PATIENT-LVL IV: CPT | Mod: PBBFAC,,, | Performed by: STUDENT IN AN ORGANIZED HEALTH CARE EDUCATION/TRAINING PROGRAM

## 2023-02-13 PROCEDURE — 99214 OFFICE O/P EST MOD 30 MIN: CPT | Mod: S$PBB,,, | Performed by: STUDENT IN AN ORGANIZED HEALTH CARE EDUCATION/TRAINING PROGRAM

## 2023-02-13 PROCEDURE — 99999 PR PBB SHADOW E&M-EST. PATIENT-LVL IV: ICD-10-PCS | Mod: PBBFAC,,, | Performed by: STUDENT IN AN ORGANIZED HEALTH CARE EDUCATION/TRAINING PROGRAM

## 2023-02-13 PROCEDURE — 99214 OFFICE O/P EST MOD 30 MIN: CPT | Mod: PBBFAC,PO | Performed by: STUDENT IN AN ORGANIZED HEALTH CARE EDUCATION/TRAINING PROGRAM

## 2023-02-13 PROCEDURE — 99214 PR OFFICE/OUTPT VISIT, EST, LEVL IV, 30-39 MIN: ICD-10-PCS | Mod: S$PBB,,, | Performed by: STUDENT IN AN ORGANIZED HEALTH CARE EDUCATION/TRAINING PROGRAM

## 2023-02-13 RX ORDER — ESTRADIOL 0.1 MG/G
CREAM VAGINAL
Qty: 42.5 G | Refills: 0 | Status: SHIPPED | OUTPATIENT
Start: 2023-02-13 | End: 2024-01-29

## 2023-02-13 NOTE — PROGRESS NOTES
"  [facility] Urology   Clinic Note    Subjective:     Chief Complaint: Follow-up (Reoccuring UTIs/ prior ultrasound/ currently on antibiotics (one day left on them))    History of Present Illness:  Yamileth Harrell is a 72 y.o. female who presents to clinic for evaluation and management of kidney stones and recurrent UTIs.     He was initially seen for recurrent UTIs and found to have a left ureteral stone.  She underwent ureteral stent insertion followed by subsequent left ureteroscopy.  The stone was present for some time and was somewhat impacted at the time of the ureteroscopy.  She presents today with a renal ultrasound from 02/06/2023 which was independently interpreted showing some mild dilation of the collecting system on the left, however there are bilateral ureteral jets noted on the ultrasound.  She did have a recent UTI from 02/10/23 growing pansensitive E coli which she is currently being treated for with Bactrim.    She has not been using the vaginal estrogen cream.    Past medical, family, surgical and social history reviewed as documented in chart with pertinent positive medical, family, surgical and social history detailed in HPI.    A review systems was conducted with pertinent positive and negative findings documented in HPI.    Objective:     Estimated body mass index is 20.79 kg/m² as calculated from the following:    Height as of this encounter: 5' 7" (1.702 m).    Weight as of this encounter: 60.2 kg (132 lb 11.5 oz).    Vital Signs (Most Recent)       Physical Exam  Vitals and nursing note reviewed.   Constitutional:       General: She is not in acute distress.     Appearance: She is well-developed. She is not ill-appearing or toxic-appearing.   Pulmonary:      Effort: Pulmonary effort is normal. No accessory muscle usage or respiratory distress.   Neurological:      Mental Status: She is alert. Mental status is at baseline.       Lab Results   Component Value Date    BUN 17 11/18/2022    " CREATININE 0.81 11/18/2022    WBC 7.02 11/18/2022    HGB 12.5 11/18/2022    HCT 35.9 (L) 11/18/2022     11/18/2022    AST 45 (H) 11/18/2022    ALT 18 11/18/2022    ALKPHOS 48 11/18/2022    ALBUMIN 3.9 11/18/2022      Assessment:     1. Hydronephrosis of left kidney    2. Recurrent UTI      Plan:     Given the findings I would recommend a CT urogram as a functional study  Discussed the vaginal estrogen and its use.  Recommend restarting this further recurrent urinary tract infections.    John Soto MD

## 2023-02-13 NOTE — PATIENT INSTRUCTIONS
Kidney Stones and Dietary Prevention    Diet is a martínez environmental factor which can be modified to reduce stone formation. A simple dietary approach which is beneficial for the majority of kidney stone patients is reviewed in this handout.    Urine needs to be supersaturated with the chemicals that compose the stones for them to develop. This means that there is a high concentration of these chemicals present. When this occurs, the respective chemicals crystallize, clump together (aggregate) and a stone eventually develops. This can be due to excessive excretion of these chemicals in urine and/or dehydration. This is a simplified description of this process which is actually quite complex and modulated by a number of other factors.        Fluids  Consuming an adequate amount of fluid is a simple and safe method of decreasing urinary super saturation. Tap water is an excellent and readily available fluid for this purpose. However, other fluids may be utilized. Citrus juices and some mineral water preparations contain citric acid which is a chemical that inhibits the crystallization of stone-forming chemicals.    The goal for adult patients is to consume enough fluid for a daily urine output of 2 to 3 liters. Drinking 8 - 10 ounces of water per hour while awake will usually allow this to occur. Another index of adequate hydration is that urine should be almost as light as water.    Animal Protein  The consumption of large amounts of animal protein produces a number of changes in the urinary environment which may promote stone formation including lower pH, increased excretion of calcium, oxalate and uric acid, and reduced excretion of citrate. Therefore, limiting consumption of animal protein, especially red meat, is advisable. The Amie's diet should be avoided as it is based on high protein intake which will promote all of the aforementioned urinary changes. A daily protein intake of 1 gram per kilogram of body  weight (1 kilogram = 2.2 pounds) is recommended.    Salt (sodium)  High salt consumption should be avoided as it promotes a number of deleterious urinary changes for stone patients including an increase in urinary calcium and cystine excretion, and a reduction in citrate excretion. Sodium intake should be limited to 2 grams per day for adults.    Dairy Products  The majority of kidney stones are composed of calcium, and a combination of oxalate or phosphate or both. Dietary calcium restriction was recommended for a number of years. However, there is now ample evidence that this should not be undertaken. Most adults should eat enough dairy products or other calcium containing foods which in total provides 1 to 1.2 grams of calcium per day. An effort should be made to equally distribute calcium intake throughout daily meals.    High-Oxalate Containing Foods  Calcium oxalate stone-formers should avoid eating foods containing high amounts of oxalate. Examples of such foods include spinach, parsley, rhubarb, cranberries, celery, peanuts, soy products, fiber containing cereals, bran and chocolate.    Vitamin Supplements  Stone patients should avoid consuming excessive amounts of Vitamin C (ascorbate/ascorbic acid). The amount of Vitamin C present in the standard multi-vitamin tablet it not excessive and thus these can be taken. However, other preparations containing greater amounts of this vitamin should not be taken as this may increase urinary oxalate excretion. Calcium supplements such as Tums and Citracal may promote increased calcium excretion when consumed. Thus, one should let their urologist know if they are taking such preparations so that specimen urine testing can be done to determine whether continued utilization is advisable.    [email or web address]

## 2023-08-31 PROBLEM — H54.8 LEGALLY BLIND: Status: RESOLVED | Noted: 2020-05-26 | Resolved: 2023-08-31

## 2023-08-31 PROBLEM — E87.6 HYPOKALEMIA: Status: RESOLVED | Noted: 2021-02-24 | Resolved: 2023-08-31

## 2023-08-31 PROBLEM — N20.0 KIDNEY STONES: Status: RESOLVED | Noted: 2022-09-27 | Resolved: 2023-08-31

## 2023-08-31 PROBLEM — N20.1 LEFT URETERAL STONE: Status: RESOLVED | Noted: 2022-10-23 | Resolved: 2023-08-31

## 2023-08-31 PROBLEM — H54.3 BLINDNESS OF BOTH EYES: Status: ACTIVE | Noted: 2023-07-10

## 2023-08-31 PROBLEM — K59.00 CONSTIPATION: Status: RESOLVED | Noted: 2021-05-07 | Resolved: 2023-08-31

## 2023-08-31 PROBLEM — Z95.5 PRESENCE OF CORONARY ANGIOPLASTY IMPLANT AND GRAFT: Status: ACTIVE | Noted: 2021-02-23

## 2023-08-31 PROBLEM — I34.0 MITRAL VALVE REGURGITATION: Status: ACTIVE | Noted: 2019-10-14

## 2023-08-31 PROBLEM — M35.01 KERATITIS SICCA, BILATERAL: Status: ACTIVE | Noted: 2023-07-10

## 2023-08-31 PROBLEM — J90 BILATERAL PLEURAL EFFUSION: Status: RESOLVED | Noted: 2021-02-26 | Resolved: 2023-08-31

## 2023-08-31 PROBLEM — I65.29 CAROTID ARTERY STENOSIS: Status: ACTIVE | Noted: 2021-10-11

## 2023-08-31 PROBLEM — Z87.891 PERSONAL HISTORY OF NICOTINE DEPENDENCE: Status: ACTIVE | Noted: 2021-02-23

## 2023-08-31 PROBLEM — H16.223 KERATITIS SICCA, BILATERAL: Status: ACTIVE | Noted: 2023-07-10

## 2023-08-31 PROBLEM — K62.89 RECTAL PAIN: Status: RESOLVED | Noted: 2021-05-06 | Resolved: 2023-08-31

## 2023-08-31 PROBLEM — Z79.82 LONG TERM (CURRENT) USE OF ASPIRIN: Status: ACTIVE | Noted: 2020-06-17

## 2023-08-31 PROBLEM — Z91.81 HISTORY OF FALLING: Status: ACTIVE | Noted: 2021-03-03

## 2023-08-31 PROBLEM — L29.0 RECTAL ITCHING: Status: RESOLVED | Noted: 2021-05-06 | Resolved: 2023-08-31

## 2023-08-31 PROBLEM — H25.812 COMBINED FORMS OF AGE-RELATED CATARACT OF LEFT EYE: Status: ACTIVE | Noted: 2023-07-10

## 2023-08-31 PROBLEM — H44.521 PHTHISIS BULBI OF RIGHT EYE: Status: ACTIVE | Noted: 2023-07-10

## 2023-09-05 PROBLEM — I70.0 AORTIC ATHEROSCLEROSIS: Status: ACTIVE | Noted: 2023-09-05

## 2023-09-05 PROBLEM — I50.43 ACUTE ON CHRONIC COMBINED SYSTOLIC AND DIASTOLIC CONGESTIVE HEART FAILURE: Status: ACTIVE | Noted: 2023-09-05

## 2023-12-08 ENCOUNTER — TELEPHONE (OUTPATIENT)
Dept: PRIMARY CARE CLINIC | Facility: CLINIC | Age: 73
End: 2023-12-08
Payer: MEDICARE

## 2023-12-08 ENCOUNTER — TELEPHONE (OUTPATIENT)
Dept: FAMILY MEDICINE | Facility: CLINIC | Age: 73
End: 2023-12-08
Payer: MEDICARE

## 2023-12-08 NOTE — TELEPHONE ENCOUNTER
----- Message from Molly Bro sent at 12/8/2023 11:02 AM CST -----  Name of Who is Calling:VASILIY NOLASCO [[id number]]        What is the request in detail:Pt sister would like call back from the office in regards to unable to log on to portal for appt, pt sister called [facility] assistance as well. Please advise thank you       Can the clinic reply by [facility]:NO        What Number to Call Back if not in [facility]:.Telephone Information:  Mobile          [phone]

## 2023-12-08 NOTE — TELEPHONE ENCOUNTER
Left message for pt requesting a call back at earliest convenience. Dr. Penn does not see other providers' patients.

## 2024-01-25 ENCOUNTER — OFFICE VISIT (OUTPATIENT)
Dept: UROLOGY | Facility: CLINIC | Age: 74
End: 2024-01-25
Payer: MEDICARE

## 2024-01-25 VITALS — HEIGHT: 67 IN | BODY MASS INDEX: 21.38 KG/M2 | WEIGHT: 136.25 LBS

## 2024-01-25 DIAGNOSIS — N39.0 RECURRENT UTI: ICD-10-CM

## 2024-01-25 DIAGNOSIS — N13.30 HYDRONEPHROSIS OF LEFT KIDNEY: Primary | ICD-10-CM

## 2024-01-25 DIAGNOSIS — N20.0 KIDNEY STONES: ICD-10-CM

## 2024-01-25 LAB
BILIRUBIN, UA POC OHS: NEGATIVE
BLOOD, UA POC OHS: NEGATIVE
CLARITY, UA POC OHS: ABNORMAL
COLOR, UA POC OHS: YELLOW
GLUCOSE, UA POC OHS: NEGATIVE
KETONES, UA POC OHS: ABNORMAL
LEUKOCYTES, UA POC OHS: ABNORMAL
NITRITE, UA POC OHS: NEGATIVE
PH, UA POC OHS: 6
PROTEIN, UA POC OHS: NEGATIVE
SPECIFIC GRAVITY, UA POC OHS: 1.02
UROBILINOGEN, UA POC OHS: 1

## 2024-01-25 PROCEDURE — 81003 URINALYSIS AUTO W/O SCOPE: CPT | Mod: PBBFAC,PO | Performed by: STUDENT IN AN ORGANIZED HEALTH CARE EDUCATION/TRAINING PROGRAM

## 2024-01-25 PROCEDURE — 99999PBSHW POCT URINALYSIS(INSTRUMENT): Mod: PBBFAC,,,

## 2024-01-25 PROCEDURE — 99214 OFFICE O/P EST MOD 30 MIN: CPT | Mod: S$PBB,,, | Performed by: STUDENT IN AN ORGANIZED HEALTH CARE EDUCATION/TRAINING PROGRAM

## 2024-01-25 PROCEDURE — 99213 OFFICE O/P EST LOW 20 MIN: CPT | Mod: PBBFAC,PO | Performed by: STUDENT IN AN ORGANIZED HEALTH CARE EDUCATION/TRAINING PROGRAM

## 2024-01-25 PROCEDURE — 99999 PR PBB SHADOW E&M-EST. PATIENT-LVL III: CPT | Mod: PBBFAC,,, | Performed by: STUDENT IN AN ORGANIZED HEALTH CARE EDUCATION/TRAINING PROGRAM

## 2024-01-25 NOTE — PROGRESS NOTES
"  [facility] - Urology   Clinic Note    Subjective:     Chief Complaint: Urinary Tract Infection (Recurrent UTI)    History of Present Illness:  Yamileth Harrell is a 73 y.o. female who presents to clinic for evaluation and management of kidney stones and recurrent UTIs.    He was initially seen for recurrent UTIs and found to have a left ureteral stone. She underwent left ureteroscopy. The stone was present for some time and was somewhat impacted at the time of the ureteroscopy. Post op US showed mild hydronephrosis. She did not have repeat imaging that was ordered. She did have 2 recent positive cultures from 12/23 and 1/24 growing aerococcus but she was not having symptoms. She was also admitted for the flu recently.     She was previously having infections more frequently but these have decreased since using vaginal estrogen cream.    Past medical, family, surgical and social history reviewed as documented in chart with pertinent positive medical, family, surgical and social history detailed in HPI.    A review systems was conducted with pertinent positive and negative findings documented in HPI.    Objective:     Estimated body mass index is 21.34 kg/m² as calculated from the following:    Height as of this encounter: 5' 7" (1.702 m).    Weight as of this encounter: 61.8 kg (136 lb 3.9 oz).    Vital Signs (Most Recent)       Physical Exam  Vitals and nursing note reviewed.   Constitutional:       General: She is not in acute distress.     Appearance: She is well-developed. She is not ill-appearing or toxic-appearing.   Pulmonary:      Effort: Pulmonary effort is normal. No accessory muscle usage or respiratory distress.   Neurological:      Mental Status: She is alert. Mental status is at baseline.         Lab Results   Component Value Date    BUN 25 (H) 12/05/2023    CREATININE 0.99 12/05/2023    WBC 10.04 12/05/2023    HGB 13.3 12/05/2023    HCT 37.0 12/05/2023     12/05/2023    AST 78 (H) 12/05/2023    " ALT 70 (H) 12/05/2023    ALKPHOS 87 12/05/2023    ALBUMIN 3.9 12/05/2023    HGBA1C 4.8 09/06/2023      Assessment:     1. Hydronephrosis of left kidney    2. Recurrent UTI    3. Kidney stones      Plan:     Repeat renal US today. Consider CT urogram or NM renal scan if hydronephrosis persists  Continue vaginal estrogen   Recommend increased hydration  Discussed bowel regimen    John Soto MD

## 2024-01-29 DIAGNOSIS — N39.0 RECURRENT UTI: ICD-10-CM

## 2024-01-29 RX ORDER — ESTRADIOL 0.1 MG/G
CREAM VAGINAL
Qty: 42.5 G | Refills: 0 | Status: SHIPPED | OUTPATIENT
Start: 2024-01-29 | End: 2024-04-01

## 2024-01-30 ENCOUNTER — HOSPITAL ENCOUNTER (OUTPATIENT)
Dept: RADIOLOGY | Facility: HOSPITAL | Age: 74
Discharge: HOME | End: 2024-01-30
Attending: STUDENT IN AN ORGANIZED HEALTH CARE EDUCATION/TRAINING PROGRAM
Payer: MEDICARE

## 2024-01-30 DIAGNOSIS — N13.30 HYDRONEPHROSIS OF LEFT KIDNEY: ICD-10-CM

## 2024-01-30 DIAGNOSIS — N39.0 RECURRENT UTI: ICD-10-CM

## 2024-01-30 DIAGNOSIS — N20.0 KIDNEY STONES: ICD-10-CM

## 2024-01-30 PROCEDURE — 76770 US EXAM ABDO BACK WALL COMP: CPT | Mod: 26,,, | Performed by: RADIOLOGY

## 2024-01-30 PROCEDURE — 76770 US EXAM ABDO BACK WALL COMP: CPT | Mod: TC,PO

## 2024-03-19 ENCOUNTER — CLINICAL SUPPORT (OUTPATIENT)
Dept: UROLOGY | Facility: CLINIC | Age: 74
End: 2024-03-19
Payer: MEDICARE

## 2024-03-19 DIAGNOSIS — N39.0 RECURRENT UTI: Primary | ICD-10-CM

## 2024-03-19 LAB
BILIRUBIN, UA POC OHS: NEGATIVE
BLOOD, UA POC OHS: ABNORMAL
CLARITY, UA POC OHS: ABNORMAL
COLOR, UA POC OHS: YELLOW
GLUCOSE, UA POC OHS: NEGATIVE
KETONES, UA POC OHS: NEGATIVE
LEUKOCYTES, UA POC OHS: ABNORMAL
NITRITE, UA POC OHS: NEGATIVE
PH, UA POC OHS: 7
PROTEIN, UA POC OHS: NEGATIVE
SPECIFIC GRAVITY, UA POC OHS: 1.01
UROBILINOGEN, UA POC OHS: 0.2

## 2024-03-19 PROCEDURE — 81003 URINALYSIS AUTO W/O SCOPE: CPT | Mod: PBBFAC,PO

## 2024-03-19 PROCEDURE — 51701 INSERT BLADDER CATHETER: CPT | Mod: PBBFAC,PO

## 2024-03-19 PROCEDURE — 51701 INSERT BLADDER CATHETER: CPT | Mod: S$PBB,,, | Performed by: STUDENT IN AN ORGANIZED HEALTH CARE EDUCATION/TRAINING PROGRAM

## 2024-03-19 PROCEDURE — 99213 OFFICE O/P EST LOW 20 MIN: CPT | Mod: PBBFAC,PO

## 2024-03-19 PROCEDURE — 99999PBSHW POCT URINALYSIS(INSTRUMENT): Mod: PBBFAC,,,

## 2024-03-19 PROCEDURE — 87186 SC STD MICRODIL/AGAR DIL: CPT | Performed by: STUDENT IN AN ORGANIZED HEALTH CARE EDUCATION/TRAINING PROGRAM

## 2024-03-19 PROCEDURE — 87088 URINE BACTERIA CULTURE: CPT | Performed by: STUDENT IN AN ORGANIZED HEALTH CARE EDUCATION/TRAINING PROGRAM

## 2024-03-19 PROCEDURE — 87077 CULTURE AEROBIC IDENTIFY: CPT | Performed by: STUDENT IN AN ORGANIZED HEALTH CARE EDUCATION/TRAINING PROGRAM

## 2024-03-19 PROCEDURE — 99999 PR PBB SHADOW E&M-EST. PATIENT-LVL III: CPT | Mod: PBBFAC,,,

## 2024-03-19 PROCEDURE — 87086 URINE CULTURE/COLONY COUNT: CPT | Performed by: STUDENT IN AN ORGANIZED HEALTH CARE EDUCATION/TRAINING PROGRAM

## 2024-03-19 NOTE — PROGRESS NOTES
Patient to clinic for catheterized urine sample. Patient catheterized using sterile technique, aprox, 90 ml cloudy yellow urine drained from patient bladder. Patient tolerated well.

## 2024-03-21 ENCOUNTER — TELEPHONE (OUTPATIENT)
Dept: UROLOGY | Facility: CLINIC | Age: 74
End: 2024-03-21
Payer: MEDICARE

## 2024-03-21 DIAGNOSIS — N30.00 ACUTE CYSTITIS WITHOUT HEMATURIA: Primary | ICD-10-CM

## 2024-03-21 RX ORDER — CEFPODOXIME PROXETIL 100 MG/1
100 TABLET, FILM COATED ORAL 2 TIMES DAILY
Qty: 14 TABLET | Refills: 0 | Status: SHIPPED | OUTPATIENT
Start: 2024-03-21 | End: 2024-03-28

## 2024-03-21 NOTE — TELEPHONE ENCOUNTER
----- Message from John Soto MD sent at 3/21/2024  2:14 PM CDT -----  Please let the patient know an antibiotic was sent for the UTI.

## 2024-03-22 LAB — BACTERIA UR CULT: ABNORMAL

## 2024-03-29 DIAGNOSIS — N39.0 RECURRENT UTI: ICD-10-CM

## 2024-04-01 RX ORDER — ESTRADIOL 0.1 MG/G
CREAM VAGINAL
Qty: 42.5 G | Refills: 0 | Status: SHIPPED | OUTPATIENT
Start: 2024-04-01 | End: 2024-04-02

## 2024-04-02 ENCOUNTER — OFFICE VISIT (OUTPATIENT)
Dept: UROLOGY | Facility: CLINIC | Age: 74
End: 2024-04-02
Payer: MEDICARE

## 2024-04-02 VITALS — WEIGHT: 127.19 LBS | HEIGHT: 67 IN | BODY MASS INDEX: 19.96 KG/M2

## 2024-04-02 DIAGNOSIS — N39.0 RECURRENT UTI: Primary | ICD-10-CM

## 2024-04-02 DIAGNOSIS — N20.0 KIDNEY STONES: ICD-10-CM

## 2024-04-02 DIAGNOSIS — R30.0 DYSURIA: ICD-10-CM

## 2024-04-02 LAB
BILIRUBIN, UA POC OHS: NEGATIVE
BLOOD, UA POC OHS: NEGATIVE
CLARITY, UA POC OHS: ABNORMAL
COLOR, UA POC OHS: YELLOW
GLUCOSE, UA POC OHS: NEGATIVE
KETONES, UA POC OHS: NEGATIVE
LEUKOCYTES, UA POC OHS: ABNORMAL
NITRITE, UA POC OHS: NEGATIVE
PH, UA POC OHS: 7.5
POC RESIDUAL URINE VOLUME: 5 ML (ref 0–100)
PROTEIN, UA POC OHS: NEGATIVE
SPECIFIC GRAVITY, UA POC OHS: 1.01
UROBILINOGEN, UA POC OHS: 0.2

## 2024-04-02 PROCEDURE — 99999PBSHW POCT URINALYSIS(INSTRUMENT): Mod: PBBFAC,,,

## 2024-04-02 PROCEDURE — 99999PBSHW POCT BLADDER SCAN: Mod: PBBFAC,,,

## 2024-04-02 PROCEDURE — 99214 OFFICE O/P EST MOD 30 MIN: CPT | Mod: S$PBB,,, | Performed by: STUDENT IN AN ORGANIZED HEALTH CARE EDUCATION/TRAINING PROGRAM

## 2024-04-02 PROCEDURE — 87086 URINE CULTURE/COLONY COUNT: CPT | Performed by: STUDENT IN AN ORGANIZED HEALTH CARE EDUCATION/TRAINING PROGRAM

## 2024-04-02 PROCEDURE — 99999 PR PBB SHADOW E&M-EST. PATIENT-LVL III: CPT | Mod: PBBFAC,,, | Performed by: STUDENT IN AN ORGANIZED HEALTH CARE EDUCATION/TRAINING PROGRAM

## 2024-04-02 PROCEDURE — 87077 CULTURE AEROBIC IDENTIFY: CPT | Performed by: STUDENT IN AN ORGANIZED HEALTH CARE EDUCATION/TRAINING PROGRAM

## 2024-04-02 PROCEDURE — 99213 OFFICE O/P EST LOW 20 MIN: CPT | Mod: PBBFAC,PO,25 | Performed by: STUDENT IN AN ORGANIZED HEALTH CARE EDUCATION/TRAINING PROGRAM

## 2024-04-02 PROCEDURE — 87088 URINE BACTERIA CULTURE: CPT | Performed by: STUDENT IN AN ORGANIZED HEALTH CARE EDUCATION/TRAINING PROGRAM

## 2024-04-02 PROCEDURE — 51798 US URINE CAPACITY MEASURE: CPT | Mod: PBBFAC,PO | Performed by: STUDENT IN AN ORGANIZED HEALTH CARE EDUCATION/TRAINING PROGRAM

## 2024-04-02 PROCEDURE — 81003 URINALYSIS AUTO W/O SCOPE: CPT | Mod: PBBFAC,PO | Performed by: STUDENT IN AN ORGANIZED HEALTH CARE EDUCATION/TRAINING PROGRAM

## 2024-04-02 PROCEDURE — 87186 SC STD MICRODIL/AGAR DIL: CPT | Performed by: STUDENT IN AN ORGANIZED HEALTH CARE EDUCATION/TRAINING PROGRAM

## 2024-04-02 RX ORDER — PHENAZOPYRIDINE HYDROCHLORIDE 100 MG/1
100 TABLET, FILM COATED ORAL
Qty: 21 TABLET | Refills: 0 | Status: SHIPPED | OUTPATIENT
Start: 2024-04-02 | End: 2024-04-09

## 2024-04-02 RX ORDER — ESTRADIOL 0.1 MG/G
1 CREAM VAGINAL
Qty: 42.5 G | Refills: 3 | Status: SHIPPED | OUTPATIENT
Start: 2024-04-03

## 2024-04-02 NOTE — PROGRESS NOTES
"  [facility] - [facility]   Clinic Note    Subjective:     Chief Complaint: Dysuria (Burns while burning ) and Urinary Tract Infection    History of Present Illness:  Yamileth Harrell is a 73 y.o. female who presents to clinic for evaluation and management of kidney stones and recurrent UTIs.    She had a recent episode of cystitis. Culture 3/19/2024 grew pan-sensitive E coli. She reports discomfort with urination. Denies trouble urinating. She completed a course of Vantin. Still reporting some dysuria. PVR was measured as 5 mL.     She was initially seen for recurrent UTIs and found to have a left ureteral stone. She underwent left ureteroscopy. The stone was present for some time and was somewhat impacted at the time of the ureteroscopy. Post op US showed mild hydronephrosis. Recent US 1/2024 showed resolution of the hydronephrosis with possible non-obstructing stones bilaterally.     She was previously having infections more frequently but these have decreased since using vaginal estrogen cream. She has been using it twice a week.     Past medical, family, surgical and social history reviewed as documented in chart with pertinent positive medical, family, surgical and social history detailed in HPI.    A review systems was conducted with pertinent positive and negative findings documented in HPI.    Objective:     Estimated body mass index is 19.92 kg/m² as calculated from the following:    Height as of this encounter: 5' 7" (1.702 m).    Weight as of this encounter: 57.7 kg (127 lb 3.3 oz).    Vital Signs (Most Recent)       Physical Exam  Vitals and nursing note reviewed.   Constitutional:       General: She is not in acute distress.     Appearance: She is well-developed. She is not ill-appearing or toxic-appearing.   Pulmonary:      Effort: Pulmonary effort is normal. No accessory muscle usage or respiratory distress.   Neurological:      Mental Status: She is alert. Mental status is at baseline.         Lab Results "   Component Value Date    BUN 13 03/19/2024    CREATININE 0.84 03/19/2024    WBC 8.68 03/19/2024    HGB 13.0 03/19/2024    HCT 38.0 03/19/2024     03/19/2024    AST 34 03/19/2024    ALT 23 03/19/2024    ALKPHOS 75 03/19/2024    ALBUMIN 3.8 03/19/2024    HGBA1C 5.0 03/19/2024      Assessment:     1. Recurrent UTI    2. Dysuria    3. Kidney stones      Plan:     Continue vaginal estrogen to 3 times weekly  Recommend increased hydration  Discussed bowel regimen  Pyridium for the dysuria  LE on UA today. Likely residual inflammation from recent cystitis but  repeat culture  Observation for the stones on US.    Jonh Soto MD

## 2024-04-04 ENCOUNTER — TELEPHONE (OUTPATIENT)
Dept: UROLOGY | Facility: CLINIC | Age: 74
End: 2024-04-04
Payer: MEDICARE

## 2024-04-04 NOTE — TELEPHONE ENCOUNTER
----- Message from Nicky Norman sent at 4/4/2024  2:09 PM CDT -----  Contact: patient sister  Type:  Needs Medical Advice    Who Called: Patient's sisterJeri     Would the patient rather a call back or a response via [facility]? Call     Best Call Back Number: [phone] (home)      Additional Information: Patient's sisterJeri would like to speak with the nurse in regards to her medication.     Please call to advise

## 2024-04-05 DIAGNOSIS — N30.00 ACUTE CYSTITIS WITHOUT HEMATURIA: Primary | ICD-10-CM

## 2024-04-05 LAB — BACTERIA UR CULT: ABNORMAL

## 2024-04-05 RX ORDER — CIPROFLOXACIN 500 MG/1
500 TABLET ORAL 2 TIMES DAILY
Qty: 14 TABLET | Refills: 0 | Status: SHIPPED | OUTPATIENT
Start: 2024-04-05 | End: 2024-04-12

## 2024-07-15 ENCOUNTER — OFFICE VISIT (OUTPATIENT)
Dept: UROLOGY | Facility: CLINIC | Age: 74
End: 2024-07-15
Payer: MEDICARE

## 2024-07-15 VITALS — BODY MASS INDEX: 19.96 KG/M2 | HEIGHT: 67 IN | WEIGHT: 127.19 LBS

## 2024-07-15 DIAGNOSIS — N39.0 RECURRENT UTI: Primary | ICD-10-CM

## 2024-07-15 DIAGNOSIS — N20.0 KIDNEY STONES: ICD-10-CM

## 2024-07-15 DIAGNOSIS — N95.2 ATROPHIC VAGINITIS: ICD-10-CM

## 2024-07-15 PROBLEM — N13.30 HYDRONEPHROSIS OF LEFT KIDNEY: Status: RESOLVED | Noted: 2021-02-27 | Resolved: 2024-07-15

## 2024-07-15 LAB
BILIRUBIN, UA POC OHS: NEGATIVE
BLOOD, UA POC OHS: NEGATIVE
CLARITY, UA POC OHS: ABNORMAL
COLOR, UA POC OHS: YELLOW
GLUCOSE, UA POC OHS: NEGATIVE
KETONES, UA POC OHS: NEGATIVE
LEUKOCYTES, UA POC OHS: ABNORMAL
NITRITE, UA POC OHS: NEGATIVE
PH, UA POC OHS: 6
POC RESIDUAL URINE VOLUME: 21 ML (ref 0–100)
PROTEIN, UA POC OHS: NEGATIVE
SPECIFIC GRAVITY, UA POC OHS: <=1.005
UROBILINOGEN, UA POC OHS: 0.2

## 2024-07-15 PROCEDURE — 99999PBSHW POCT BLADDER SCAN: Mod: PBBFAC,,,

## 2024-07-15 PROCEDURE — 51798 US URINE CAPACITY MEASURE: CPT | Mod: PBBFAC,PO | Performed by: STUDENT IN AN ORGANIZED HEALTH CARE EDUCATION/TRAINING PROGRAM

## 2024-07-15 PROCEDURE — 99214 OFFICE O/P EST MOD 30 MIN: CPT | Mod: S$PBB,,, | Performed by: STUDENT IN AN ORGANIZED HEALTH CARE EDUCATION/TRAINING PROGRAM

## 2024-07-15 PROCEDURE — 99999PBSHW POCT URINALYSIS(INSTRUMENT): Mod: PBBFAC,,,

## 2024-07-15 PROCEDURE — 99999 PR PBB SHADOW E&M-EST. PATIENT-LVL III: CPT | Mod: PBBFAC,,, | Performed by: STUDENT IN AN ORGANIZED HEALTH CARE EDUCATION/TRAINING PROGRAM

## 2024-07-15 PROCEDURE — 81003 URINALYSIS AUTO W/O SCOPE: CPT | Mod: PBBFAC,PO | Performed by: STUDENT IN AN ORGANIZED HEALTH CARE EDUCATION/TRAINING PROGRAM

## 2024-07-15 PROCEDURE — 99213 OFFICE O/P EST LOW 20 MIN: CPT | Mod: PBBFAC,PO | Performed by: STUDENT IN AN ORGANIZED HEALTH CARE EDUCATION/TRAINING PROGRAM

## 2024-07-15 NOTE — PROGRESS NOTES
"[facility] - [facility]   Clinic Note    Subjective:     Chief Complaint: Urinary Tract Infection    History of Present Illness:  Yamileth Harrell is a 73 y.o. female who presents to clinic for evaluation and management of kidney stones and recurrent UTIs.    She has a history of recurrent UTIs. The frequency of infections have decreased since using vaginal estrogen cream. She has increased to three times a week. She reports resolution of dysuria and urinary discomfort. PVR today is 21 mL.   She is on a bowel regimen with good results.     She has a history of stones and underwent left ureteroscopy for a left ureteral stone. The stone was present for some time and was somewhat impacted at the time of the ureteroscopy. Post op US showed mild hydronephrosis. More recent US 1/2024 showed resolution of the hydronephrosis with possible non-obstructing stones bilaterally.     Past medical, family, surgical and social history reviewed as documented in chart with pertinent positive medical, family, surgical and social history detailed in HPI.    A review systems was conducted with pertinent positive and negative findings documented in HPI.    Objective:     Estimated body mass index is 19.92 kg/m² as calculated from the following:    Height as of this encounter: 5' 7" (1.702 m).    Weight as of this encounter: 57.7 kg (127 lb 3.3 oz).    Vital Signs (Most Recent)       Physical Exam  Vitals and nursing note reviewed.   Constitutional:       General: She is not in acute distress.     Appearance: She is well-developed. She is not ill-appearing or toxic-appearing.   Pulmonary:      Effort: Pulmonary effort is normal. No accessory muscle usage or respiratory distress.   Neurological:      Mental Status: She is alert. Mental status is at baseline.       Lab Results   Component Value Date    BUN 13 03/19/2024    CREATININE 0.84 03/19/2024    WBC 8.68 03/19/2024    HGB 13.0 03/19/2024    HCT 38.0 03/19/2024     03/19/2024    AST " 34 03/19/2024    ALT 23 03/19/2024    ALKPHOS 75 03/19/2024    ALBUMIN 3.8 03/19/2024    HGBA1C 5.0 03/19/2024      Assessment:     1. Recurrent UTI    2. Atrophic vaginitis    3. Kidney stones      Plan:     She is doing very well on the estrace 3 times weekly. Symptoms have resolved.  Continue bowel regimen  Observation for the stones on US    Follow up 1 year with Olinda Soto MD

## 2024-09-04 ENCOUNTER — CLINICAL SUPPORT (OUTPATIENT)
Dept: UROLOGY | Facility: CLINIC | Age: 74
End: 2024-09-04
Payer: MEDICARE

## 2024-09-04 DIAGNOSIS — N39.0 RECURRENT UTI: Primary | ICD-10-CM

## 2024-09-04 LAB
BILIRUBIN, UA POC OHS: NEGATIVE
BLOOD, UA POC OHS: ABNORMAL
CLARITY, UA POC OHS: ABNORMAL
COLOR, UA POC OHS: YELLOW
GLUCOSE, UA POC OHS: NEGATIVE
KETONES, UA POC OHS: NEGATIVE
LEUKOCYTES, UA POC OHS: ABNORMAL
NITRITE, UA POC OHS: NEGATIVE
PH, UA POC OHS: 6
PROTEIN, UA POC OHS: NEGATIVE
SPECIFIC GRAVITY, UA POC OHS: <=1.005
UROBILINOGEN, UA POC OHS: 0.2

## 2024-09-04 PROCEDURE — 51701 INSERT BLADDER CATHETER: CPT | Mod: S$PBB,,, | Performed by: STUDENT IN AN ORGANIZED HEALTH CARE EDUCATION/TRAINING PROGRAM

## 2024-09-04 PROCEDURE — 99212 OFFICE O/P EST SF 10 MIN: CPT | Mod: PBBFAC,PO,25

## 2024-09-04 PROCEDURE — 51701 INSERT BLADDER CATHETER: CPT | Mod: PBBFAC,PO

## 2024-09-04 PROCEDURE — 87086 URINE CULTURE/COLONY COUNT: CPT | Performed by: STUDENT IN AN ORGANIZED HEALTH CARE EDUCATION/TRAINING PROGRAM

## 2024-09-04 PROCEDURE — 99999 PR PBB SHADOW E&M-EST. PATIENT-LVL II: CPT | Mod: PBBFAC,,,

## 2024-09-04 PROCEDURE — 99999PBSHW POCT URINALYSIS(INSTRUMENT): Mod: PBBFAC,,,

## 2024-09-04 PROCEDURE — 81003 URINALYSIS AUTO W/O SCOPE: CPT | Mod: PBBFAC,PO

## 2024-09-04 NOTE — PROGRESS NOTES
Patient to clinic for catheterized urine sample per DR Charles matos. Patient catheterized using sterile technique, aprox 60 ml cloudy yellow urine drained from patient bladder. Patient tolerated well .

## 2024-09-05 ENCOUNTER — PATIENT MESSAGE (OUTPATIENT)
Dept: UROLOGY | Facility: CLINIC | Age: 74
End: 2024-09-05
Payer: MEDICARE

## 2024-09-05 DIAGNOSIS — N20.0 KIDNEY STONES: Primary | ICD-10-CM

## 2024-09-06 LAB — BACTERIA UR CULT: NO GROWTH

## 2024-09-10 ENCOUNTER — HOSPITAL ENCOUNTER (OUTPATIENT)
Dept: RADIOLOGY | Facility: HOSPITAL | Age: 74
Discharge: HOME | End: 2024-09-10
Attending: STUDENT IN AN ORGANIZED HEALTH CARE EDUCATION/TRAINING PROGRAM
Payer: MEDICARE

## 2024-09-10 DIAGNOSIS — N20.0 KIDNEY STONES: ICD-10-CM

## 2024-09-10 PROCEDURE — 76770 US EXAM ABDO BACK WALL COMP: CPT | Mod: TC,PO

## 2024-09-10 PROCEDURE — 76770 US EXAM ABDO BACK WALL COMP: CPT | Mod: 26,,, | Performed by: RADIOLOGY

## 2025-03-20 ENCOUNTER — LAB VISIT (OUTPATIENT)
Dept: LAB | Facility: HOSPITAL | Age: 75
End: 2025-03-20
Attending: NURSE PRACTITIONER
Payer: MEDICARE

## 2025-03-20 DIAGNOSIS — E03.9 HYPOTHYROIDISM: Primary | ICD-10-CM

## 2025-03-20 DIAGNOSIS — E78.5 HYPERLIPIDEMIA: ICD-10-CM

## 2025-03-20 DIAGNOSIS — I50.32 CHRONIC DIASTOLIC CONGESTIVE HEART FAILURE: ICD-10-CM

## 2025-03-20 DIAGNOSIS — Z13.1 SCREENING FOR DIABETES MELLITUS: ICD-10-CM

## 2025-03-20 DIAGNOSIS — R73.01 IMPAIRED FASTING BLOOD SUGAR: ICD-10-CM

## 2025-03-20 LAB
ANION GAP SERPL CALC-SCNC: 9 MMOL/L (ref 8–16)
BASOPHILS # BLD AUTO: 0.03 K/UL (ref 0–0.2)
BASOPHILS NFR BLD: 0.5 % (ref 0–1.9)
BUN SERPL-MCNC: 17 MG/DL (ref 8–23)
CALCIUM SERPL-MCNC: 8.6 MG/DL (ref 8.7–10.5)
CHLORIDE SERPL-SCNC: 112 MMOL/L (ref 95–110)
CHOLEST SERPL-MCNC: 118 MG/DL (ref 120–199)
CHOLEST/HDLC SERPL: 2.4 {RATIO} (ref 2–5)
CO2 SERPL-SCNC: 23 MMOL/L (ref 23–29)
CREAT SERPL-MCNC: 0.8 MG/DL (ref 0.5–1.4)
DIFFERENTIAL METHOD BLD: NORMAL
EOSINOPHIL # BLD AUTO: 0.1 K/UL (ref 0–0.5)
EOSINOPHIL NFR BLD: 1.7 % (ref 0–8)
ERYTHROCYTE [DISTWIDTH] IN BLOOD BY AUTOMATED COUNT: 12.8 % (ref 11.5–14.5)
EST. GFR  (NO RACE VARIABLE): >60 ML/MIN/1.73 M^2
ESTIMATED AVG GLUCOSE: 94 MG/DL (ref 68–131)
GLUCOSE SERPL-MCNC: 90 MG/DL (ref 70–110)
HBA1C MFR BLD: 4.9 % (ref 4–5.6)
HCT VFR BLD AUTO: 37.6 % (ref 37–48.5)
HDLC SERPL-MCNC: 50 MG/DL (ref 40–75)
HDLC SERPL: 42.4 % (ref 20–50)
HGB BLD-MCNC: 13 G/DL (ref 12–16)
IMM GRANULOCYTES # BLD AUTO: 0.02 K/UL (ref 0–0.04)
IMM GRANULOCYTES NFR BLD AUTO: 0.3 % (ref 0–0.5)
LDLC SERPL CALC-MCNC: 52.6 MG/DL (ref 63–159)
LYMPHOCYTES # BLD AUTO: 1.6 K/UL (ref 1–4.8)
LYMPHOCYTES NFR BLD: 27.4 % (ref 18–48)
MCH RBC QN AUTO: 30.5 PG (ref 27–31)
MCHC RBC AUTO-ENTMCNC: 34.6 G/DL (ref 32–36)
MCV RBC AUTO: 88 FL (ref 82–98)
MONOCYTES # BLD AUTO: 0.6 K/UL (ref 0.3–1)
MONOCYTES NFR BLD: 10.8 % (ref 4–15)
NEUTROPHILS # BLD AUTO: 3.5 K/UL (ref 1.8–7.7)
NEUTROPHILS NFR BLD: 59.3 % (ref 38–73)
NONHDLC SERPL-MCNC: 68 MG/DL
NRBC BLD-RTO: 0 /100 WBC
PLATELET # BLD AUTO: 202 K/UL (ref 150–450)
PMV BLD AUTO: 10.6 FL (ref 9.2–12.9)
POTASSIUM SERPL-SCNC: 4 MMOL/L (ref 3.5–5.1)
RBC # BLD AUTO: 4.26 M/UL (ref 4–5.4)
SODIUM SERPL-SCNC: 144 MMOL/L (ref 136–145)
T3FREE SERPL-MCNC: 2.5 PG/ML (ref 2.3–4.2)
T4 FREE SERPL-MCNC: 1.12 NG/DL (ref 0.71–1.51)
TRIGL SERPL-MCNC: 77 MG/DL (ref 30–150)
TSH SERPL DL<=0.005 MIU/L-ACNC: 2.43 UIU/ML (ref 0.4–4)
WBC # BLD AUTO: 5.95 K/UL (ref 3.9–12.7)

## 2025-03-20 PROCEDURE — 84439 ASSAY OF FREE THYROXINE: CPT | Performed by: NURSE PRACTITIONER

## 2025-03-20 PROCEDURE — 85025 COMPLETE CBC W/AUTO DIFF WBC: CPT | Performed by: NURSE PRACTITIONER

## 2025-03-20 PROCEDURE — 80061 LIPID PANEL: CPT | Performed by: NURSE PRACTITIONER

## 2025-03-20 PROCEDURE — 84481 FREE ASSAY (FT-3): CPT | Performed by: NURSE PRACTITIONER

## 2025-03-20 PROCEDURE — 36415 COLL VENOUS BLD VENIPUNCTURE: CPT | Mod: PO | Performed by: NURSE PRACTITIONER

## 2025-03-20 PROCEDURE — 83036 HEMOGLOBIN GLYCOSYLATED A1C: CPT | Performed by: NURSE PRACTITIONER

## 2025-03-20 PROCEDURE — 80048 BASIC METABOLIC PNL TOTAL CA: CPT | Performed by: NURSE PRACTITIONER

## 2025-03-20 PROCEDURE — 84443 ASSAY THYROID STIM HORMONE: CPT | Performed by: NURSE PRACTITIONER

## 2025-04-10 ENCOUNTER — PATIENT MESSAGE (OUTPATIENT)
Dept: FAMILY MEDICINE | Facility: CLINIC | Age: 75
End: 2025-04-10

## 2025-04-10 ENCOUNTER — E-CONSULT (OUTPATIENT)
Dept: ENDOCRINOLOGY | Facility: CLINIC | Age: 75
End: 2025-04-10
Payer: MEDICARE

## 2025-04-10 ENCOUNTER — OFFICE VISIT (OUTPATIENT)
Dept: FAMILY MEDICINE | Facility: CLINIC | Age: 75
End: 2025-04-10
Payer: MEDICARE

## 2025-04-10 VITALS
DIASTOLIC BLOOD PRESSURE: 60 MMHG | WEIGHT: 132.69 LBS | HEART RATE: 77 BPM | OXYGEN SATURATION: 96 % | RESPIRATION RATE: 18 BRPM | SYSTOLIC BLOOD PRESSURE: 102 MMHG | TEMPERATURE: 98 F | HEIGHT: 67 IN | BODY MASS INDEX: 20.83 KG/M2

## 2025-04-10 DIAGNOSIS — M81.0 OSTEOPOROSIS, UNSPECIFIED OSTEOPOROSIS TYPE, UNSPECIFIED PATHOLOGICAL FRACTURE PRESENCE: Primary | ICD-10-CM

## 2025-04-10 DIAGNOSIS — E78.2 MIXED HYPERLIPIDEMIA: ICD-10-CM

## 2025-04-10 DIAGNOSIS — I25.10 ASCVD (ARTERIOSCLEROTIC CARDIOVASCULAR DISEASE): ICD-10-CM

## 2025-04-10 DIAGNOSIS — E03.4 HYPOTHYROIDISM DUE TO ACQUIRED ATROPHY OF THYROID: ICD-10-CM

## 2025-04-10 PROCEDURE — 99204 OFFICE O/P NEW MOD 45 MIN: CPT | Mod: S$GLB,,, | Performed by: FAMILY MEDICINE

## 2025-04-10 RX ORDER — ROSUVASTATIN CALCIUM 10 MG/1
10 TABLET, COATED ORAL NIGHTLY
Qty: 90 TABLET | Refills: 3 | Status: SHIPPED | OUTPATIENT
Start: 2025-04-10

## 2025-04-10 RX ORDER — LEVOTHYROXINE SODIUM 75 UG/1
75 TABLET ORAL
Qty: 90 TABLET | Refills: 3 | Status: SHIPPED | OUTPATIENT
Start: 2025-04-10
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
## 2025-04-10 NOTE — CONSULTS
[facility]  Response for E-Consult     Patient Name: Yamileth Harrell  MRN: [id number]  Primary Care Provider: Karyn Torres MD   Requesting Provider: Karyn Torres MD  E-Consult to Endocrinology  Consult performed by: Flip Giron DO  Consult ordered by: Karyn Torres MD  Reason for consult: Osteoporosis  Assessment/Recommendations: See note       I have reviewed the chart and recent bone density scan.  Labs have showed a pattern of normal thyroid function on replacement and serum calcium levels have been low normal or even low at times.  This raises suspicion for poor calcium absorption either from dietary limitation or from other history (celiac disease, gastric bypass history etc.) which overtime can affect the bones.  It would be helpful to check a 24 hour urine calcium to evaluate total body calcium supply but she should focus on calcium supplementation with goal of a minimum of 1200 mg a day between diet and supplements.  Vitamin D in the past has been normal but I would suggest at least 1000 IU a day of D3 for now.      When evaluating the bone density scan further her risk of fracture is actually high based on the FRAX score at the hip (>4.5%) and in that scenario oral bisphosphonate's would be a better second line option.  Alternative first line agents to consider would be Reclast, Prolia or anabolic's (Evenity, Forteo, Tymlos).  This is partly patient preference but if no preference one of those other first line options would be better suited.  Regardless therapy is recommended in some form.      So for now consider one of those first line options and also consider urine calcium testing to evaluate calcium better with goal to have adequate oral calcium supplementation and Vitamin D as well.  Monitor DXA at 2 years no matter the choice in therapy.    Reach out if questions.     Total time of Consultation: 10 minute    I did not speak to the requesting provider verbally  about this.     *This eConsult is based on the clinical data available to me and is furnished without benefit of a physical examination. The eConsult will need to be interpreted in light of any clinical issues or changes in patient status not available to me at the time of filing this eConsults. Significant changes in patient condition or level of acuity should result in immediate formal consultation and reevaluation. Please alert me if you have further questions.    Thank you for this eConsult referral.     DO Darci Torres javier - [facility]

## 2025-04-16 NOTE — TELEPHONE ENCOUNTER
Advised pt's sister that she can discard all that she has collected so far and that she will need to do again with provided supplies once the order is in.  Verbalized understanding of all instructions.  Sister states she will do the 24 hour urine.  Please enter orders.

## 2025-04-24 ENCOUNTER — TELEPHONE (OUTPATIENT)
Dept: FAMILY MEDICINE | Facility: CLINIC | Age: 75
End: 2025-04-24
Payer: MEDICARE

## 2025-04-24 ENCOUNTER — APPOINTMENT (OUTPATIENT)
Dept: LAB | Facility: HOSPITAL | Age: 75
End: 2025-04-24
Payer: MEDICARE

## 2025-04-24 DIAGNOSIS — M81.0 OSTEOPOROSIS, UNSPECIFIED OSTEOPOROSIS TYPE, UNSPECIFIED PATHOLOGICAL FRACTURE PRESENCE: ICD-10-CM

## 2025-04-24 LAB
CALCIUM 24H UR-MRATE: 6 MG/HR (ref 4–12)
CALCIUM UR-MCNC: 8.4 MG/DL
TOTAL HOURS OF COLLECTION (OHS): 24 HR
TOTAL VOLUME  (OHS): 1750 ML
URINE CALCIUM ABSOLUTE (OHS): 147 MG/SPEC

## 2025-04-24 PROCEDURE — 82340 ASSAY OF CALCIUM IN URINE: CPT

## 2025-04-24 NOTE — TELEPHONE ENCOUNTER
----- Message from Patience sent at 4/24/2025  9:03 AM CDT -----  Name of Who is Calling:VASILIY NOLASCO [[id number]]  What is the request in detail:Pt sister dropped off her urine sample at 7am. Pt sister started the urine test wednesday 3:20 am.   Can the clinic reply by [facility]:Call  What Number to Call Back if not in [facility]:Telephone Information:Mobile          [phone]

## 2025-04-27 ENCOUNTER — PATIENT MESSAGE (OUTPATIENT)
Dept: FAMILY MEDICINE | Facility: CLINIC | Age: 75
End: 2025-04-27
Payer: MEDICARE

## 2025-04-27 DIAGNOSIS — M81.0 OSTEOPOROSIS, UNSPECIFIED OSTEOPOROSIS TYPE, UNSPECIFIED PATHOLOGICAL FRACTURE PRESENCE: Primary | ICD-10-CM

## 2025-05-05 ENCOUNTER — TELEPHONE (OUTPATIENT)
Dept: FAMILY MEDICINE | Facility: CLINIC | Age: 75
End: 2025-05-05
Payer: MEDICARE

## 2025-05-05 NOTE — H&P (VIEW-ONLY)
"[place] [facility] - [facility]rology   Clinic Note    SUBJECTIVE:     Chief Complaint: Hematuria (Pt states she has been having reoccurring UTIs for the past few years. Pt has been uncomfortable. Has noticed blood in urine and pain. No issues with emptying bladder. )    History of Present Illness:  Yamileth Harrell is a 71 y.o. female who presents to clinic for recurrent UTIs. She is established to our clinic and was last seen by Dr. Penn.    Denies dysuria or pressure. She is blind so unclear if she had gross hematuria. Also she has no sense of smell. Her sister states she saw the urine and it was more tea colored and had a foul smell.     Component Urine Culture, Routine   Latest Ref Rng & Units    9/22/2022 PSEUDOMONAS AERUGINOSA (A) . .    9/22/2022 ESCHERICHIA COLI (A) . . .   7/14/2022 ESCHERICHIA COLI (A) . . .   3/15/2022 ESCHERICHIA COLI (A) . . .   2/23/2021 ESCHERICHIA COLI (A) . . .     The urine culture February 2021 is associated with a ureteral stone which was also associated with bacteremia.  She underwent a left ureteral stent insertion and subsequent stent removal with Dr. Penn.    She reports issues with constipation. She drinks prune juice. She has large bowel movements about every 4 days.     Past medical, family, surgical and social history reviewed as documented in chart with pertinent positive medical, family, surgical and social history detailed in HPI.    A review systems was conducted with pertinent positive and negative findings documented in HPI.    OBJECTIVE:     Estimated body mass index is 19.58 kg/m² as calculated from the following:    Height as of 8/22/22: 5' 7" (1.702 m).    Weight as of 8/22/22: 56.7 kg (125 lb).    Vital Signs (Most Recent)       Physical Exam  Vitals and nursing note reviewed.   Constitutional:       General: She is not in acute distress.     Appearance: She is well-developed. She is not ill-appearing or toxic-appearing.   Pulmonary:      Effort: Pulmonary effort is " normal. No accessory muscle usage or respiratory distress.   Neurological:      General: No focal deficit present.      Mental Status: She is alert and oriented to person, place, and time. Mental status is at baseline.   Psychiatric:         Mood and Affect: Mood normal.         Behavior: Behavior normal.         Thought Content: Thought content normal.         Judgment: Judgment normal.       Lab Results   Component Value Date    BUN 13 07/13/2022    CREATININE 0.79 07/13/2022    WBC 6.58 07/16/2021    HGB 13.4 07/16/2021    HCT 40.6 07/16/2021     07/16/2021    AST 32 07/13/2022    ALT 18 07/13/2022    ALKPHOS 95 07/13/2022    ALBUMIN 3.8 07/13/2022      Urine dipstick shows positive for leukocytes, no blood.     ASSESSMENT     1. Recurrent UTI    2. Kidney stones      PLAN:   1. Recurrent UTI  -     US Kidney  -     estradioL (ESTRACE) 0.01 % (0.1 mg/gram) vaginal cream    2. Kidney stones  -     US Kidney    Discussed options for her recurrent UTIs.   General recommendations include increasing fluid intake to 2-3 L of water per day and avoiding constipation. She has taken miralax, lactulose, metamucil. Currently on prune juice. Recommended daily or every other day BMs.  Vaginal estrogen is effective for preventing UTIs in post-menopausal women. She is a candidate for vaginal estrogen. A new Rx was sent to her pharmacy.  Over the counter options include:   D-Mannose 2000 U once a day, or 1000 U twice a day may be used as prophylaxis and may be particularly useful for E coli specific UTIs. There is mixed evidence to support its use but there is overall low risk to this option.   Probiotics with at least 20 billion CFU or foods that improve gut kirti such as yogurt or other fermented foods. There is mixed evidence to support its use and further evidence is required in the future.  Cranberry may be offered as prophylaxis including oral juice and tablet formulations as there is not sufficient evidence to  support one formulation over another. In addition, there is little risk to cranberry supplements, further increasing their appeal to patients. However, it must be noted that fruit juices can be high in sugar content, which limits its use in diabetic patients.  Prophylactic antibiotics are rarely recommended but can be used as a last resort or for post-coital prophylaxis      John Soto MD        <<----- Click to Select Surgeon Luis Alberto Bueno (Attending)

## 2025-05-12 ENCOUNTER — TELEPHONE (OUTPATIENT)
Dept: FAMILY MEDICINE | Facility: CLINIC | Age: 75
End: 2025-05-12
Payer: MEDICARE

## 2025-05-26 ENCOUNTER — TELEPHONE (OUTPATIENT)
Dept: FAMILY MEDICINE | Facility: CLINIC | Age: 75
End: 2025-05-26
Payer: MEDICARE

## 2025-05-26 NOTE — TELEPHONE ENCOUNTER
Pt's sister wanted to update you with pt's bp.  She was advised to log pt's bp prior to stopping metoprolol by Dr. Reyes's office.  She is going to provide the log to his office but states her bp started trending up at the end of the week.  Still currently taking the Metoprolol.

## 2025-06-06 DIAGNOSIS — N39.0 RECURRENT UTI: ICD-10-CM

## 2025-06-06 RX ORDER — ESTRADIOL 0.1 MG/G
CREAM VAGINAL
Qty: 42.5 G | Refills: 3 | Status: SHIPPED | OUTPATIENT
Start: 2025-06-06

## 2025-06-10 ENCOUNTER — TELEPHONE (OUTPATIENT)
Dept: FAMILY MEDICINE | Facility: CLINIC | Age: 75
End: 2025-06-10
Payer: MEDICARE

## 2025-06-10 NOTE — TELEPHONE ENCOUNTER
Copied from CRM #[id number]. Topic: General Inquiry - Patient Advice  >> Jun 9, 2025 11:27 AM Jelena wrote:  Type:  Sooner Apoointment Request    Caller is requesting a sooner appointment.  Caller declined first available appointment listed below.  Caller will not accept being placed on the waitlist and is requesting a message be sent to doctor.  Name of Caller:pt   When is the first available appointment?oct   Would the patient rather a call back or a response via [facility]? Call back   Best Call Back Number: [phone]    Additional Information: pt would like to schedule appt sooner so she can get a referral for her urology

## 2025-07-18 ENCOUNTER — E-VISIT (OUTPATIENT)
Dept: URGENT CARE | Facility: CLINIC | Age: 75
End: 2025-07-18
Payer: MEDICARE

## 2025-07-18 DIAGNOSIS — Z76.89 ENCOUNTER TO ESTABLISH CARE: Primary | ICD-10-CM

## 2025-07-18 PROCEDURE — 99421 OL DIG E/M SVC 5-10 MIN: CPT | Mod: ,,, | Performed by: NURSE PRACTITIONER

## 2025-07-29 ENCOUNTER — OFFICE VISIT (OUTPATIENT)
Dept: ENDOCRINOLOGY | Facility: CLINIC | Age: 75
End: 2025-07-29
Payer: MEDICARE

## 2025-07-29 DIAGNOSIS — Z91.81 HISTORY OF FALLING: Primary | ICD-10-CM

## 2025-07-29 DIAGNOSIS — E03.4 HYPOTHYROIDISM DUE TO ACQUIRED ATROPHY OF THYROID: ICD-10-CM

## 2025-07-29 DIAGNOSIS — M81.0 OSTEOPOROSIS, UNSPECIFIED OSTEOPOROSIS TYPE, UNSPECIFIED PATHOLOGICAL FRACTURE PRESENCE: ICD-10-CM

## 2025-07-29 DIAGNOSIS — E55.9 VITAMIN D DEFICIENCY: ICD-10-CM

## 2025-07-29 PROCEDURE — G2211 COMPLEX E/M VISIT ADD ON: HCPCS | Mod: 95,,, | Performed by: STUDENT IN AN ORGANIZED HEALTH CARE EDUCATION/TRAINING PROGRAM

## 2025-07-29 PROCEDURE — 98002 SYNCH AUDIO-VIDEO NEW MOD 45: CPT | Mod: 95,,, | Performed by: STUDENT IN AN ORGANIZED HEALTH CARE EDUCATION/TRAINING PROGRAM

## 2025-07-29 NOTE — PROGRESS NOTES
Patient ID: Yamileth Harrell is a 74 y.o. female.    Chief Complaint: General Illness (Entered automatically based on patient selection in [employer].)    The patient initiated a request through [employer] on 2025 for evaluation and management with a chief complaint of General Illness (Entered automatically based on patient selection in [employer].)     I evaluated the questionnaire submission on 25. A  referral to establish care with primary care has been placed.     [facility]    2025  7:08 PM CDT - Filed by Patient   What do you need help with? Other Concern   Do you agree to participate in an E-Visit? Yes   If you have any of the following symptoms, please go to the nearest emergency room or call 911: I acknowledge   What is the main issue you would like addressed today? Thyroid   Please describe your symptoms. Unknown   Where is your problem located? Thyriod   On a scale of 1-10, where 10 is the worst you can imagine, how severe are your symptoms? (range: 1 - 10) 1   Have you had these symptoms before? No   How long have you been having these symptoms? (Just today, For a few days, For a week, For one to four weeks, For more than a month) Just today   What helps with your symptoms? New Doctor   What makes your symptoms feel worse? Unknown   Are these symptoms related to a condition that you currently have? (Yes, No, Not sure) Not sure   Please describe any probable cause for your symptoms. No Doctor   Provide any information you feel is important to your history not asked above New Doctor   Please attach any relevant images or files    Are you able to take your vitals? Yes   Systolic Blood Pressure 97   Diastolic Blood Pressure 79   Weight: 123.6   Height: 67   Pluse: 97   Temp 98   Resp: 89   SpO2 97         No diagnosis found.     No orders of the defined types were placed in this encounter.           No follow-ups on file.      E-Visit Time Trackin

## 2025-07-29 NOTE — PROGRESS NOTES
ENDOCRINOLOGY NEW PATIENT VISIT: 07/29/2025    The patient location is: Home  The chief complaint leading to consultation is: Osteoporosis    Visit type: audiovisual    Face to Face time with patient: 23 minutes  40 minutes of total time spent on the encounter, which includes face to face time and non-face to face time preparing to see the patient (eg, review of tests), Obtaining and/or reviewing separately obtained history, Documenting clinical information in the electronic or other health record, Independently interpreting results (not separately reported) and communicating results to the patient/family/caregiver, or Care coordination (not separately reported).     Each patient to whom he or she provides medical services by telemedicine is:  (1) informed of the relationship between the physician and patient and the respective role of any other health care provider with respect to management of the patient; and (2) notified that he or she may decline to receive medical services by telemedicine and may withdraw from such care at any time.    Subjective:      Patient ID: Yamileth Harrell is a 74 y.o. female.    Chief Complaint:  Consult, Osteoporosis, and Thyroid Nodule    History of Present Illness      Yamileth Harrell presents today for evaluation of osteoporosis.     She has a history of bone density scans showing concerns for osteoporosis since 2021. Most recent bone density scan from October 2024 continues to demonstrate osteoporotic changes with a T-score of -3.4 at the left femoral location, indicating severe osteoporosis. She denies prior fracture history. Her estimated fracture risk is 15% for major osteoporotic fracture and 5% for hip fracture over the next 10 years. She has a history of breast cancer, minimal personal history of kidney stones with suggestion of potential family history, and hypothyroidism. She denies any personal history of bone fractures. Medical records indicate a concerning past history of  falls though she states no recent falls. She experienced menopause in her 50s. She is currently taking Citracal with vitamin D daily for approximately 2 months and levothyroxine 75 mcg daily for hypothyroidism with TSH currently within normal limits. She denies prior use of prescription osteoporosis medications. Both parents  in their 50s. She denies known family history of fractures or osteoporosis. She denies tobacco and alcohol use. She performs daily bed-based exercises focusing on legs and hips, including use of a stationary bike, with additional exercises on larger equipment approximately once monthly. She reports limited dairy intake, consuming some eggs and milk occasionally. She reports potential need for dental extractions but no specific dental procedures have been planned or scheduled. She understands the distinction between minor dental procedures and major procedures involving tooth extraction or dental implants.            Regarding Osteoporosis:    - Most recent DEXA: 10/30/2024    FINDINGS:  L1 to L4 vertebral bone mineral density is equal to 0.828 g/cm squared with a T score of -2.0.  This has decreased compared to the prior study.     Left femoral bone mineral density is equal to 0.529 g/cm squared with a T score of -3.4.  This has decreased compared to the prior study.     There is a 15.0% risk of a major osteoporotic fracture and a 5.0% risk of hip fracture in the next 10 years (FRAX).     Impression:  1. Osteoporosis according to WHO criteria.    Lab Results   Component Value Date    [id number] 58 2023    [id number] 48 2022    [id number] 57 2021    CALCIUM 8.6 (L) 2025    CALCIUM 9.2 2024    CALCIUM 9.0 2024    PHOS 1.4 (L) 2021    PHOS 2.3 (L) 2021    ALKPHOS 67 2024    ALKPHOS 75 2024    ALKPHOS 87 2023    TSH 2.428 2025    [id number] 6 2025       - Fracture history   None    - Past osteoporosis  medication: None  - Current osteoporosis medication: None    - Calcium intake:  Citracal once day.  Eats fair amount of dairy products.       - Vit D3 intake:  Citracal with Vitamin D (unsure of amount)    - Post-menopausal age: 48    - Pertinent factors:  No   Yes  [x]    []  Tobacco use  [x]    []  Alcohol use  [x]    []  Current diarrhea or history of malabsorption (Celiac disease)  []    [x]  Thyroid disease (on levothyroxine)  [x]    []  Anemia  []    [x]  Kidney stones  [x]    []  Hyperparathyroidism  [x]    []  High risk medications include: none  [x]    []  Recent falls  [x]    []  Height loss greater than 2 inches  []    [x]  Tolerating weight-bearing exercise    - Significant history or physical findings:  No   Yes  [x]    []  Estrogen replacement therapy after menopause  [x]    []  Mother or father with hip/spine fracture or diagnosed with osteoporosis  [x]    []  History of malignancy involving bone (such as metastasis)  [x]    []  Prior radiation treatment  []    [x]  Dental work planned      ROS:   As above    Objective:     There were no vitals taken for this visit.  BP Readings from Last 3 Encounters:   04/10/25 102/60   02/10/25 100/70   10/29/24 (!) 100/58     Wt Readings from Last 1 Encounters:   04/10/25 1102 60.2 kg (132 lb 11.5 oz)     There is no height or weight on file to calculate BMI.    Physical Exam    General: No acute distress. Nontoxic appearing.  Psychiatric: Normal mood. Normal affect. No evidence of SI.           Lab Review:   Lab Results   Component Value Date    HGBA1C 4.9 03/20/2025     Lab Results   Component Value Date    CHOL 118 (L) 03/20/2025    HDL 50 03/20/2025    LDLCALC 52.6 (L) 03/20/2025    TRIG 77 03/20/2025    CHOLHDL 42.4 03/20/2025     Lab Results   Component Value Date     03/20/2025    K 4.0 03/20/2025     (H) 03/20/2025    CO2 23 03/20/2025    GLU 90 03/20/2025    BUN 17 03/20/2025    CREATININE 0.8 03/20/2025    CALCIUM 8.6 (L) 03/20/2025    PROT  6.7 08/28/2024    ALBUMIN 3.9 08/28/2024    BILITOT 0.7 08/28/2024    ALKPHOS 67 08/28/2024    AST 29 08/28/2024    ALT 18 08/28/2024    ANIONGAP 9 03/20/2025    ESTGFRAFRICA >60 07/13/2022    EGFRNONAA >60 07/13/2022    TSH 2.428 03/20/2025     Vit D, 25-Hydroxy   Date Value Ref Range Status   09/06/2023 58 30 - 96 ng/mL Final     Comment:     Vitamin D deficiency.........<10 ng/mL                              Vitamin D insufficiency......10-29 ng/mL       Vitamin D sufficiency........> or equal to 30 ng/mL  Vitamin D toxicity............>100 ng/mL         Assessment and Plan       Severe osteoporosis with very high fracture risk based on T-score of -3.4 at left femoral location.  Current fracture risk: 15% for major osteoporotic fracture and 5% for hip fracture in next 10 years.  No prior fracture history or osteoporosis medication use.  Considered Reclast (once yearly for 3+ years) or Prolia (every 6 months for 4-5+ years) as primary treatment options.  Stronger agents (Evenity, Forteo, Tymlos) may be considered if fragility fracture develops or bone density worsens despite therapy.  Delayed treatment initiation pending dental evaluation due to potential osteonecrosis of jaw risk.  Hypothyroidism well-managed on current levothyroxine dose.    OSTEOPOROSIS, UNSPECIFIED OSTEOPOROSIS TYPE, UNSPECIFIED PATHOLOGICAL FRACTURE PRESENCE:  - Discussed general osteoporosis education, rare risks of atypical femur fractures and osteonecrosis of jaw, calcium and vitamin D intake recommendations, importance of weight-bearing exercise   - Patient to schedule dental visit to evaluate need for major dental work, particularly tooth extraction   - Recommend continuing weight-bearing exercises   - Message the office after dental evaluation to report if any major dental work is needed    HISTORY OF FALLING:  - Discussed fall precautions due to history of falls Patient to maintain fall precautions at home    VITAMIN D DEFICIENCY:  -  Continued Citracal with vitamin D daily   - Ordered renal function panel and vitamin D to assess calcium levels and appropriateness of current Citracal dosage     PLAN SUMMARY:  - Continue Citracal with vitamin D daily  - Ordered renal function panel and vitamin D labs  - Maintain fall precautions at home  - Continue weight-bearing exercises  - Schedule dental visit to evaluate need for major dental work  - Message office after dental evaluation if major dental work is needed  - Provider will message patient with lab results  - Medical assistant to contact patient for scheduling labs          RTC in 1 year.  Labs soon.      **Visit today included increased complexity associated with the care of the problems addressed and managing the longitudinal care of the patient due to the serious and/or complex managed problems      Flip Giron,      This note was generated with the assistance of ambient listening technology. Verbal consent was obtained by the patient and accompanying visitor(s) for the recording of patient appointment to facilitate this note. I attest to having reviewed and edited the generated note for accuracy, though some syntax or spelling errors may persist. Please contact the author of this note for any clarification.

## 2025-07-29 NOTE — PATIENT INSTRUCTIONS
Understanding Reclast and Prolia: Treatments for Bone Strength  You have two main options to help strengthen your bones and lower your risk of fractures: Reclast and Prolia. Both are medications that help slow down bone loss and help your bones stay strong, but they work a little differently.    Reclast (zoledronic acid)  How it works: Reclast slows down the cells that break down bone. This helps your bones stay dense and strong.  How its given: Its a once-a-year IV infusion (goes into your vein at a clinic).  How long to take it: Often given for 3 to 5 years, depending on your risk.  When its not a good choice: If you have serious kidney problems, Reclast may not be safe for you.  Possible side effects:  Flu-like symptoms for a couple of days after the infusion (fever, muscle aches)  Low calcium levels (usually if calcium/vitamin D are low to begin with)  Rare risk of jaw bone problems or unusual thigh fractures (more common after long-term use)    Prolia (denosumab)  How it works: Prolia blocks a signal in the body that tells bones to break down, so it helps keep bone mass up.  How its given: Its a shot given every 6 months in the arm, usually at your doctors office.  How long to take it: Usually long-term; stopping suddenly can lead to bone loss or fractures, so switching plans should be carefully managed.  When its not a good choice: If you have very low calcium levels that arent corrected first.  Possible side effects:  Low calcium (especially in people with kidney disease)  Skin infections or rashes  Rare risk of jaw bone problems or thigh fractures (more likely after many years)    Which is better?  It depends on your bone density level, kidney function, other health conditions, and whats easier for you (once a year vs. twice a year). Both work well at reducing fracture risk.  Well talk through your labs and risks to pick the one thats safest and most effective for you.    Do be sure you get  the teeth evaluated first though.        Osteoporosis Treatment     Regardless if you are on a prescription medication for osteoporosis or osteopenia, one should still do all of the following. All of these things combined help to reduce your fracture risk. The goal of all these treatments is to reduce your risk of fracture.      Take Calcium and Vitamin D- It is important to get a minimum amount of 1200 mg of calcium daily. That can be in the diet or in the form of a supplement. Also a minimum of 800 IU of Vitamin D should be taken a day. Taking a prescription medication does not take the place of taking Calcium plus vitamin D. Some trials show a reduced fracture risk with taking calcium and vitamin D.   Weight bearing exercise- this is extremely important to reduce fracture risk. Trials have shown that weight bearing exercise can reduce the risk of a hip fracture. It may also help with your bone density. At minimum one should do 30 minutes of weight bearing exercise 3 times a week. Yoga and Jos [employer] can often also help with balance.   Fall Precautions- the 1st goal in preventing a fracture is not falling. Always wear good shoes and avoid heals. Make sure you check your house to make sure there is nothing that could be a fall risk (rugs, cords). Make sure if you get up at night that there is some lighting. Be mindful with pets as they can be common reasons for a fall. We often times feel safe in our own home, but that is a common area that one can have a fracture. If you usually use a walker or a cane, make sure you use it in the home as well.      Bone Density- once a prescription medication is started, we check your bone density every 2 years. It usually does not need to be checked more than that as your bone changes very slowly. Always keep in mind the goal of therapy is to reduce your fracture risk and not normalize your bone density. Sometimes you may see a slight improvement in your bone density with treatment  or no change at all. That is ok. One of the main reasons to do a bone density is to make sure there is not a decrease in your bone density     Drug Holidays- this does not apply to Prolia. We only do drug holidays for Fosamax, Reclast, Actonel and Boniva. Stopping or delaying Prolia can cause a rebound loss of bone density and in rare cases a compression fracture.      Risks of Medications for Osteoporosis  Most patients tolerate these medications without any problems. The following do not include all the side effects of these medications  Rarely patients can develop pains with these medications. They usually will go away. However, if they are severe you should let us know immediately  Osteonecrosis of the Jaw (ONJ)- this is a rare complication of many bone medications. It is diagnosed by a dentist or oral surgeon. If you develop pain in your jaw let us know and we have you see your dentist for an evaluation. Most cases are in patients with cancer who get much larger doses of these medications. The overall risk is 1 in 10,000 to 1 in 100,000 patient years. It is always important to get regular dental cleanings. If you are planning an invasive dental procedure we may ask that you complete that prior to starting treatment.   Atypical Femur Fractures- This is also a rare side effect of these medications. The risk increases the longer you are on these medications. This is one reason we sometimes do drug holidays. This can usually present with thigh or groin pain. The overall risk is 3.2 to 50 cases per 100,000 patient years

## 2025-07-29 NOTE — Clinical Note
Please arrange labs for this patient soon near where they live.  They said to call them as they had a preferred lab.  Follow up in 1 year.  Thanks

## 2025-08-05 ENCOUNTER — LAB VISIT (OUTPATIENT)
Dept: LAB | Facility: HOSPITAL | Age: 75
End: 2025-08-05
Attending: STUDENT IN AN ORGANIZED HEALTH CARE EDUCATION/TRAINING PROGRAM
Payer: MEDICARE

## 2025-08-05 DIAGNOSIS — M81.0 OSTEOPOROSIS, UNSPECIFIED OSTEOPOROSIS TYPE, UNSPECIFIED PATHOLOGICAL FRACTURE PRESENCE: ICD-10-CM

## 2025-08-05 LAB
25(OH)D3+25(OH)D2 SERPL-MCNC: 107 NG/ML (ref 30–96)
ALBUMIN SERPL BCP-MCNC: 3.9 G/DL (ref 3.5–5.2)
ANION GAP (OHS): 6 MMOL/L (ref 8–16)
BUN SERPL-MCNC: 15 MG/DL (ref 8–23)
CALCIUM SERPL-MCNC: 10 MG/DL (ref 8.7–10.5)
CHLORIDE SERPL-SCNC: 107 MMOL/L (ref 95–110)
CO2 SERPL-SCNC: 29 MMOL/L (ref 23–29)
CREAT SERPL-MCNC: 0.8 MG/DL (ref 0.5–1.4)
GFR SERPLBLD CREATININE-BSD FMLA CKD-EPI: >60 ML/MIN/1.73/M2
GLUCOSE SERPL-MCNC: 92 MG/DL (ref 70–110)
PHOSPHATE SERPL-MCNC: 3.5 MG/DL (ref 2.7–4.5)
POTASSIUM SERPL-SCNC: 4.1 MMOL/L (ref 3.5–5.1)
SODIUM SERPL-SCNC: 142 MMOL/L (ref 136–145)

## 2025-08-05 PROCEDURE — 82306 VITAMIN D 25 HYDROXY: CPT

## 2025-08-05 PROCEDURE — 82040 ASSAY OF SERUM ALBUMIN: CPT

## 2025-08-05 PROCEDURE — 36415 COLL VENOUS BLD VENIPUNCTURE: CPT | Mod: PO

## (undated) DEVICE — SOL IRR NACL .9% 3000ML

## (undated) DEVICE — BAG URO DRAIN

## (undated) DEVICE — SPONGE GAUZE 16PLY 4X4

## (undated) DEVICE — TRAY SKIN SCRUB WET PREMIUM

## (undated) DEVICE — STENT URETERAL UNIV 6FR 26CM: Type: IMPLANTABLE DEVICE | Site: URETER | Status: NON-FUNCTIONAL

## (undated) DEVICE — SEE MEDLINE ITEM 154981

## (undated) DEVICE — SYR 10CC LUER LOCK

## (undated) DEVICE — CATH URTRL OPEN END STR TIP 5F

## (undated) DEVICE — PACK CYSTO

## (undated) DEVICE — TUBING SUC UNIV W/CONN 12FT

## (undated) DEVICE — BOWL STERILE LARGE 32OZ

## (undated) DEVICE — GOWN SURGICAL X-LARGE

## (undated) DEVICE — GLOVE BIOGEL PI MICRO SZ 7

## (undated) DEVICE — TOWEL OR DISP STRL BLUE 4/PK

## (undated) DEVICE — GUIDEWIRE UROLOGY .038X150CM

## (undated) DEVICE — CONTAINER SPECIMEN OR STER 4OZ

## (undated) DEVICE — DRAPE HALF SURGICAL 40X58IN

## (undated) DEVICE — NEPTUNE 4 PORT MANIFOLD

## (undated) DEVICE — STENT URETERAL UNIV 6FR 24CM: Type: IMPLANTABLE DEVICE | Site: URETER | Status: NON-FUNCTIONAL